# Patient Record
Sex: FEMALE | Race: WHITE | NOT HISPANIC OR LATINO | Employment: FULL TIME | URBAN - NONMETROPOLITAN AREA
[De-identification: names, ages, dates, MRNs, and addresses within clinical notes are randomized per-mention and may not be internally consistent; named-entity substitution may affect disease eponyms.]

---

## 2020-07-06 ENCOUNTER — TELEPHONE (OUTPATIENT)
Dept: OBSTETRICS AND GYNECOLOGY | Facility: CLINIC | Age: 25
End: 2020-07-06

## 2020-07-07 ENCOUNTER — OFFICE VISIT (OUTPATIENT)
Dept: OBSTETRICS AND GYNECOLOGY | Facility: CLINIC | Age: 25
End: 2020-07-07

## 2020-07-07 VITALS
WEIGHT: 265 LBS | BODY MASS INDEX: 40.16 KG/M2 | HEIGHT: 68 IN | DIASTOLIC BLOOD PRESSURE: 84 MMHG | SYSTOLIC BLOOD PRESSURE: 132 MMHG

## 2020-07-07 DIAGNOSIS — N91.2 AMENORRHEA: Primary | ICD-10-CM

## 2020-07-07 DIAGNOSIS — Z32.01 POSITIVE PREGNANCY TEST: ICD-10-CM

## 2020-07-07 PROCEDURE — 99202 OFFICE O/P NEW SF 15 MIN: CPT | Performed by: OBSTETRICS & GYNECOLOGY

## 2020-07-07 RX ORDER — FAMOTIDINE 20 MG/1
20 TABLET, FILM COATED ORAL 2 TIMES DAILY
COMMUNITY
End: 2020-08-25

## 2020-07-07 RX ORDER — CETIRIZINE HYDROCHLORIDE 10 MG/1
10 TABLET ORAL DAILY
COMMUNITY

## 2020-07-07 RX ORDER — PRENATAL VIT/IRON FUM/FOLIC AC 27MG-0.8MG
TABLET ORAL DAILY
COMMUNITY

## 2020-07-07 NOTE — PROGRESS NOTES
"Alyssia Riggs is a 25 y.o. female here today for evaluation of amenorrhea.  She reports that she has regular cycles and is trying to get pregnant.  Her last menstrual period was April 29, and she had a positive home urine pregnancy test on July 1.  She reports home urine pregnancy tests were negative the week before.  She is taking a prenatal vitamin.  She has some breast tenderness and mild left-sided crampy pain, but denies nausea or vaginal bleeding.    Social History     Tobacco Use   • Smoking status: Never Smoker   • Smokeless tobacco: Never Used   Substance Use Topics   • Alcohol use: Never     Frequency: Never   • Drug use: Never      Visit Vitals  /84 (BP Location: Left arm, Patient Position: Sitting)   Ht 172.7 cm (68\")   Wt 120 kg (265 lb)   LMP 04/29/2020   BMI 40.29 kg/m²     Pleasant female no acute distress  Mood and affect normal  Breathing unlabored  Abdomen nontender    Assessment: Secondary amenorrhea, positive pregnancy test    We discussed pain and bleeding precautions, as well as first-line therapy for pregnancy related nausea.  She will return to the office in about 3 weeks for a new OB visit, and call in the meantime if she has questions or concerns.      "

## 2020-07-30 ENCOUNTER — INITIAL PRENATAL (OUTPATIENT)
Dept: OBSTETRICS AND GYNECOLOGY | Facility: CLINIC | Age: 25
End: 2020-07-30

## 2020-07-30 VITALS — BODY MASS INDEX: 40.29 KG/M2 | SYSTOLIC BLOOD PRESSURE: 124 MMHG | WEIGHT: 265 LBS | DIASTOLIC BLOOD PRESSURE: 84 MMHG

## 2020-07-30 DIAGNOSIS — Z12.4 SCREENING FOR CERVICAL CANCER: ICD-10-CM

## 2020-07-30 DIAGNOSIS — Z34.01 ENCOUNTER FOR SUPERVISION OF NORMAL FIRST PREGNANCY IN FIRST TRIMESTER: Primary | ICD-10-CM

## 2020-07-30 PROCEDURE — 0501F PRENATAL FLOW SHEET: CPT | Performed by: OBSTETRICS & GYNECOLOGY

## 2020-07-30 PROCEDURE — G0123 SCREEN CERV/VAG THIN LAYER: HCPCS | Performed by: OBSTETRICS & GYNECOLOGY

## 2020-07-30 NOTE — PROGRESS NOTES
New OB, US today shows 8 weeks gestation, EDC 3/8/21  Having nausea, fatigue, but no headaches  New OB labs today  Discussed OTC Unisom and B6  Discussed normal prenatal routines  Questions answered

## 2020-08-04 LAB
ABO GROUP BLD: NORMAL
BACTERIA UR CULT: NO GROWTH
BACTERIA UR CULT: NORMAL
BASOPHILS # BLD AUTO: 0.07 10*3/MM3 (ref 0–0.2)
BASOPHILS NFR BLD AUTO: 0.6 % (ref 0–1.5)
BLD GP AB SCN SERPL QL: NEGATIVE
C TRACH RRNA SPEC QL NAA+PROBE: NEGATIVE
DRUGS UR: NORMAL
EOSINOPHIL # BLD AUTO: 0.08 10*3/MM3 (ref 0–0.4)
EOSINOPHIL NFR BLD AUTO: 0.7 % (ref 0.3–6.2)
ERYTHROCYTE [DISTWIDTH] IN BLOOD BY AUTOMATED COUNT: 13.4 % (ref 12.3–15.4)
HCT VFR BLD AUTO: 37.2 % (ref 34–46.6)
HGB BLD-MCNC: 12.3 G/DL (ref 12–15.9)
HIV 1+2 AB+HIV1 P24 AG SERPL QL IA: NON REACTIVE
IMM GRANULOCYTES # BLD AUTO: 0.04 10*3/MM3 (ref 0–0.05)
IMM GRANULOCYTES NFR BLD AUTO: 0.3 % (ref 0–0.5)
LYMPHOCYTES # BLD AUTO: 1.83 10*3/MM3 (ref 0.7–3.1)
LYMPHOCYTES NFR BLD AUTO: 15.4 % (ref 19.6–45.3)
MCH RBC QN AUTO: 27.4 PG (ref 26.6–33)
MCHC RBC AUTO-ENTMCNC: 33.1 G/DL (ref 31.5–35.7)
MCV RBC AUTO: 82.9 FL (ref 79–97)
MONOCYTES # BLD AUTO: 0.78 10*3/MM3 (ref 0.1–0.9)
MONOCYTES NFR BLD AUTO: 6.6 % (ref 5–12)
N GONORRHOEA RRNA SPEC QL NAA+PROBE: NEGATIVE
NEUTROPHILS # BLD AUTO: 9.06 10*3/MM3 (ref 1.7–7)
NEUTROPHILS NFR BLD AUTO: 76.4 % (ref 42.7–76)
NRBC BLD AUTO-RTO: 0 /100 WBC (ref 0–0.2)
PLATELET # BLD AUTO: 347 10*3/MM3 (ref 140–450)
RBC # BLD AUTO: 4.49 10*6/MM3 (ref 3.77–5.28)
RH BLD: POSITIVE
RPR SER QL: NON REACTIVE
RUBV IGG SERPL IA-ACNC: 1.37 INDEX
WBC # BLD AUTO: 11.86 10*3/MM3 (ref 3.4–10.8)

## 2020-08-05 LAB
GEN CATEG CVX/VAG CYTO-IMP: NORMAL
LAB AP CASE REPORT: NORMAL
LAB AP GYN ADDITIONAL INFORMATION: NORMAL
PATH INTERP SPEC-IMP: NORMAL
STAT OF ADQ CVX/VAG CYTO-IMP: NORMAL

## 2020-08-25 ENCOUNTER — ROUTINE PRENATAL (OUTPATIENT)
Dept: OBSTETRICS AND GYNECOLOGY | Facility: CLINIC | Age: 25
End: 2020-08-25

## 2020-08-25 VITALS — WEIGHT: 260 LBS | BODY MASS INDEX: 39.53 KG/M2 | SYSTOLIC BLOOD PRESSURE: 130 MMHG | DIASTOLIC BLOOD PRESSURE: 86 MMHG

## 2020-08-25 DIAGNOSIS — Z34.02 ENCOUNTER FOR SUPERVISION OF NORMAL FIRST PREGNANCY IN SECOND TRIMESTER: Primary | ICD-10-CM

## 2020-08-25 LAB
GLUCOSE UR STRIP-MCNC: NEGATIVE MG/DL
PROT UR STRIP-MCNC: NEGATIVE MG/DL

## 2020-08-25 PROCEDURE — 0502F SUBSEQUENT PRENATAL CARE: CPT | Performed by: OBSTETRICS & GYNECOLOGY

## 2020-08-25 RX ORDER — FAMOTIDINE 10 MG
10 TABLET ORAL 2 TIMES DAILY
COMMUNITY

## 2020-08-25 NOTE — PROGRESS NOTES
Feeling well, no nausea now  Reviewed normal prenatal labs  Discussed ffDNA and maternal carrier screening, declines

## 2020-09-22 ENCOUNTER — ROUTINE PRENATAL (OUTPATIENT)
Dept: OBSTETRICS AND GYNECOLOGY | Facility: CLINIC | Age: 25
End: 2020-09-22

## 2020-09-22 VITALS — SYSTOLIC BLOOD PRESSURE: 124 MMHG | DIASTOLIC BLOOD PRESSURE: 78 MMHG | WEIGHT: 265 LBS | BODY MASS INDEX: 40.29 KG/M2

## 2020-09-22 DIAGNOSIS — Z34.02 ENCOUNTER FOR SUPERVISION OF NORMAL FIRST PREGNANCY IN SECOND TRIMESTER: Primary | ICD-10-CM

## 2020-09-22 LAB
GLUCOSE UR STRIP-MCNC: NEGATIVE MG/DL
PROT UR STRIP-MCNC: NEGATIVE MG/DL

## 2020-09-22 PROCEDURE — 0502F SUBSEQUENT PRENATAL CARE: CPT | Performed by: OBSTETRICS & GYNECOLOGY

## 2020-09-22 RX ORDER — POLYETHYLENE GLYCOL 3350 17 G/17G
17 POWDER, FOR SOLUTION ORAL DAILY
COMMUNITY
End: 2021-04-22

## 2020-09-22 RX ORDER — AMOXICILLIN 250 MG
1 CAPSULE ORAL DAILY
COMMUNITY
End: 2021-04-22

## 2020-09-22 NOTE — PROGRESS NOTES
Not yet feeling fetal movement  Feeling well  Gender peek today, GIRL!  Desires ffDNA and maternal carrier screening  Schedule anatomy US

## 2020-10-23 ENCOUNTER — ROUTINE PRENATAL (OUTPATIENT)
Dept: OBSTETRICS AND GYNECOLOGY | Facility: CLINIC | Age: 25
End: 2020-10-23

## 2020-10-23 VITALS — DIASTOLIC BLOOD PRESSURE: 86 MMHG | WEIGHT: 269 LBS | BODY MASS INDEX: 40.9 KG/M2 | SYSTOLIC BLOOD PRESSURE: 136 MMHG

## 2020-10-23 DIAGNOSIS — Z34.02 ENCOUNTER FOR SUPERVISION OF NORMAL FIRST PREGNANCY IN SECOND TRIMESTER: Primary | ICD-10-CM

## 2020-10-23 LAB
GLUCOSE UR STRIP-MCNC: NEGATIVE MG/DL
PROT UR STRIP-MCNC: NEGATIVE MG/DL

## 2020-10-23 PROCEDURE — 0502F SUBSEQUENT PRENATAL CARE: CPT | Performed by: OBSTETRICS & GYNECOLOGY

## 2020-10-23 NOTE — PROGRESS NOTES
Starting to feel fetal movement  Feeling well  Has noticed worsening eczema, improved with oatmeal bath  Has anatomy US on Monday  Plans flu vaccine at work  Will call if needs topical steroids

## 2020-11-17 ENCOUNTER — ROUTINE PRENATAL (OUTPATIENT)
Dept: OBSTETRICS AND GYNECOLOGY | Facility: CLINIC | Age: 25
End: 2020-11-17

## 2020-11-17 VITALS — BODY MASS INDEX: 41.51 KG/M2 | WEIGHT: 273 LBS | DIASTOLIC BLOOD PRESSURE: 82 MMHG | SYSTOLIC BLOOD PRESSURE: 108 MMHG

## 2020-11-17 DIAGNOSIS — Z34.02 ENCOUNTER FOR SUPERVISION OF NORMAL FIRST PREGNANCY IN SECOND TRIMESTER: Primary | ICD-10-CM

## 2020-11-17 PROBLEM — Z34.01 ENCOUNTER FOR SUPERVISION OF NORMAL FIRST PREGNANCY IN FIRST TRIMESTER: Status: RESOLVED | Noted: 2020-07-30 | Resolved: 2020-11-17

## 2020-11-17 LAB
GLUCOSE UR STRIP-MCNC: NEGATIVE MG/DL
PROT UR STRIP-MCNC: NEGATIVE MG/DL

## 2020-11-17 PROCEDURE — 0502F SUBSEQUENT PRENATAL CARE: CPT | Performed by: OBSTETRICS & GYNECOLOGY

## 2020-11-17 NOTE — PROGRESS NOTES
Good fetal movement  Reviewed normal anatomy US  Glucola and Hgb next visit  Had flu vaccine at work (11/11)  Discussed possible dizzy spells and ensuring adequate hydration

## 2020-12-15 ENCOUNTER — ROUTINE PRENATAL (OUTPATIENT)
Dept: OBSTETRICS AND GYNECOLOGY | Facility: CLINIC | Age: 25
End: 2020-12-15

## 2020-12-15 VITALS — BODY MASS INDEX: 42.12 KG/M2 | DIASTOLIC BLOOD PRESSURE: 84 MMHG | SYSTOLIC BLOOD PRESSURE: 130 MMHG | WEIGHT: 277 LBS

## 2020-12-15 DIAGNOSIS — Z34.03 ENCOUNTER FOR SUPERVISION OF NORMAL FIRST PREGNANCY IN THIRD TRIMESTER: Primary | ICD-10-CM

## 2020-12-15 LAB
GLUCOSE UR STRIP-MCNC: NEGATIVE MG/DL
PROT UR STRIP-MCNC: NEGATIVE MG/DL

## 2020-12-15 PROCEDURE — 0502F SUBSEQUENT PRENATAL CARE: CPT | Performed by: OBSTETRICS & GYNECOLOGY

## 2020-12-15 NOTE — PATIENT INSTRUCTIONS
[place] Contractions  Contractions of the uterus can occur throughout pregnancy, but they are not always a sign that you are in labor. You may have practice contractions called [place] contractions. These false labor contractions are sometimes confused with true labor.  What are [place] contractions?  [place] contractions are tightening movements that occur in the muscles of the uterus before labor. Unlike true labor contractions, these contractions do not result in opening (dilation) and thinning of the cervix. Toward the end of pregnancy (32-34 weeks), [place] contractions can happen more often and may become stronger. These contractions are sometimes difficult to tell apart from true labor because they can be very uncomfortable. You should not feel embarrassed if you go to the hospital with false labor.  Sometimes, the only way to tell if you are in true labor is for your health care provider to look for changes in the cervix. The health care provider will do a physical exam and may monitor your contractions. If you are not in true labor, the exam should show that your cervix is not dilating and your water has not broken.  If there are no other health problems associated with your pregnancy, it is completely safe for you to be sent home with false labor. You may continue to have [place] contractions until you go into true labor.  How to tell the difference between true labor and false labor  True labor  · Contractions last 30-70 seconds.  · Contractions become very regular.  · Discomfort is usually felt in the top of the uterus, and it spreads to the lower abdomen and low back.  · Contractions do not go away with walking.  · Contractions usually become more intense and increase in frequency.  · The cervix dilates and gets thinner.  False labor  · Contractions are usually shorter and not as strong as true labor contractions.  · Contractions are usually irregular.  · Contractions  are often felt in the front of the lower abdomen and in the groin.  · Contractions may go away when you walk around or change positions while lying down.  · Contractions get weaker and are shorter-lasting as time goes on.  · The cervix usually does not dilate or become thin.  Follow these instructions at home:    · Take over-the-counter and prescription medicines only as told by your health care provider.  · Keep up with your usual exercises and follow other instructions from your health care provider.  · Eat and drink lightly if you think you are going into labor.  · If [place] contractions are making you uncomfortable:  ? Change your position from lying down or resting to walking, or change from walking to resting.  ? Sit and rest in a tub of warm water.  ? Drink enough fluid to keep your urine pale yellow. Dehydration may cause these contractions.  ? Do slow and deep breathing several times an hour.  · Keep all follow-up prenatal visits as told by your health care provider. This is important.  Contact a health care provider if:  · You have a fever.  · You have continuous pain in your abdomen.  Get help right away if:  · Your contractions become stronger, more regular, and closer together.  · You have fluid leaking or gushing from your vagina.  · You pass blood-tinged mucus (bloody show).  · You have bleeding from your vagina.  · You have low back pain that you never had before.  · You feel your baby’s head pushing down and causing pelvic pressure.  · Your baby is not moving inside you as much as it used to.  Summary  · Contractions that occur before labor are called [place] contractions, false labor, or practice contractions.  · [place] contractions are usually shorter, weaker, farther apart, and less regular than true labor contractions. True labor contractions usually become progressively stronger and regular, and they become more frequent.  · Manage discomfort from [place] contractions  by changing position, resting in a warm bath, drinking plenty of water, or practicing deep breathing.  This information is not intended to replace advice given to you by your health care provider. Make sure you discuss any questions you have with your health care provider.  Document Revised: 11/30/2018 Document Reviewed: 05/03/2018  Elsevier Patient Education © 2020 [employer]

## 2020-12-16 LAB
GLUCOSE 1H P 50 G GLC PO SERPL-MCNC: 159 MG/DL (ref 65–139)
HGB BLD-MCNC: 11.6 G/DL (ref 12–15.9)

## 2020-12-23 ENCOUNTER — TELEPHONE (OUTPATIENT)
Dept: OBSTETRICS AND GYNECOLOGY | Facility: CLINIC | Age: 25
End: 2020-12-23

## 2020-12-23 DIAGNOSIS — O24.419 GESTATIONAL DIABETES MELLITUS (GDM), ANTEPARTUM, GESTATIONAL DIABETES METHOD OF CONTROL UNSPECIFIED: Primary | ICD-10-CM

## 2020-12-29 ENCOUNTER — ROUTINE PRENATAL (OUTPATIENT)
Dept: OBSTETRICS AND GYNECOLOGY | Facility: CLINIC | Age: 25
End: 2020-12-29

## 2020-12-29 VITALS — BODY MASS INDEX: 42.57 KG/M2 | WEIGHT: 280 LBS | DIASTOLIC BLOOD PRESSURE: 82 MMHG | SYSTOLIC BLOOD PRESSURE: 132 MMHG

## 2020-12-29 DIAGNOSIS — Z34.83 ENCOUNTER FOR SUPERVISION OF OTHER NORMAL PREGNANCY IN THIRD TRIMESTER: Primary | ICD-10-CM

## 2020-12-29 PROBLEM — Z34.02 ENCOUNTER FOR SUPERVISION OF NORMAL FIRST PREGNANCY IN SECOND TRIMESTER: Status: RESOLVED | Noted: 2020-11-17 | Resolved: 2020-12-29

## 2020-12-29 PROBLEM — Z34.90 SUPERVISION OF NORMAL PREGNANCY: Status: ACTIVE | Noted: 2020-12-29

## 2020-12-29 LAB
GLUCOSE UR STRIP-MCNC: NEGATIVE MG/DL
PROT UR STRIP-MCNC: NEGATIVE MG/DL

## 2020-12-29 PROCEDURE — 0502F SUBSEQUENT PRENATAL CARE: CPT | Performed by: OBSTETRICS & GYNECOLOGY

## 2020-12-29 NOTE — PROGRESS NOTES
Good fetal movement  No contractions, no reflux  Normal 3 hour Glucola and Hgb  Discuss Tdap next visit  Growth US next visit for size > dates   labor precautions

## 2021-01-15 ENCOUNTER — ROUTINE PRENATAL (OUTPATIENT)
Dept: OBSTETRICS AND GYNECOLOGY | Facility: CLINIC | Age: 26
End: 2021-01-15

## 2021-01-15 VITALS — DIASTOLIC BLOOD PRESSURE: 76 MMHG | SYSTOLIC BLOOD PRESSURE: 134 MMHG | WEIGHT: 279 LBS | BODY MASS INDEX: 42.42 KG/M2

## 2021-01-15 DIAGNOSIS — Z34.83 ENCOUNTER FOR SUPERVISION OF OTHER NORMAL PREGNANCY IN THIRD TRIMESTER: Primary | ICD-10-CM

## 2021-01-15 LAB
GLUCOSE UR STRIP-MCNC: NEGATIVE MG/DL
PROT UR STRIP-MCNC: NEGATIVE MG/DL

## 2021-01-15 PROCEDURE — 90471 IMMUNIZATION ADMIN: CPT | Performed by: OBSTETRICS & GYNECOLOGY

## 2021-01-15 PROCEDURE — 0502F SUBSEQUENT PRENATAL CARE: CPT | Performed by: OBSTETRICS & GYNECOLOGY

## 2021-01-15 PROCEDURE — 90715 TDAP VACCINE 7 YRS/> IM: CPT | Performed by: OBSTETRICS & GYNECOLOGY

## 2021-01-15 NOTE — PROGRESS NOTES
Good fetal movement  No contractions  US today 18% growth, ANGLE 15.8cm  Tdap in office today   labor precautions

## 2021-01-26 ENCOUNTER — ROUTINE PRENATAL (OUTPATIENT)
Dept: OBSTETRICS AND GYNECOLOGY | Facility: CLINIC | Age: 26
End: 2021-01-26

## 2021-01-26 VITALS — WEIGHT: 285 LBS | DIASTOLIC BLOOD PRESSURE: 86 MMHG | BODY MASS INDEX: 43.33 KG/M2 | SYSTOLIC BLOOD PRESSURE: 134 MMHG

## 2021-01-26 DIAGNOSIS — Z34.03 ENCOUNTER FOR SUPERVISION OF NORMAL FIRST PREGNANCY IN THIRD TRIMESTER: Primary | ICD-10-CM

## 2021-01-26 LAB
GLUCOSE UR STRIP-MCNC: NEGATIVE MG/DL
PROT UR STRIP-MCNC: NEGATIVE MG/DL

## 2021-01-26 PROCEDURE — 0502F SUBSEQUENT PRENATAL CARE: CPT | Performed by: OBSTETRICS & GYNECOLOGY

## 2021-01-26 NOTE — PROGRESS NOTES
Good fetal movement  Labor precautions  GBS and cx's next visit    Diagnoses and all orders for this visit:    1. Encounter for supervision of normal first pregnancy in third trimester (Primary)

## 2021-02-18 ENCOUNTER — ROUTINE PRENATAL (OUTPATIENT)
Dept: OBSTETRICS AND GYNECOLOGY | Facility: CLINIC | Age: 26
End: 2021-02-18

## 2021-02-18 VITALS — SYSTOLIC BLOOD PRESSURE: 118 MMHG | DIASTOLIC BLOOD PRESSURE: 78 MMHG | BODY MASS INDEX: 44.09 KG/M2 | WEIGHT: 290 LBS

## 2021-02-18 DIAGNOSIS — Z34.03 ENCOUNTER FOR SUPERVISION OF NORMAL FIRST PREGNANCY IN THIRD TRIMESTER: Primary | ICD-10-CM

## 2021-02-18 LAB
GLUCOSE UR STRIP-MCNC: NEGATIVE MG/DL
PROT UR STRIP-MCNC: ABNORMAL MG/DL

## 2021-02-18 PROCEDURE — 0502F SUBSEQUENT PRENATAL CARE: CPT | Performed by: OBSTETRICS & GYNECOLOGY

## 2021-02-18 NOTE — PROGRESS NOTES
25 year old female  at 37.3 IUP presents for follow up prenatal care visit. No obstetrical complaints.   PE   SVE: 1 cm   A/P  at 37.3 IUP   RTC in 1 week   Labor precautions

## 2021-02-23 LAB
C TRACH RRNA SPEC QL NAA+PROBE: NEGATIVE
GP B STREP DNA SPEC QL NAA+PROBE: NEGATIVE
N GONORRHOEA RRNA SPEC QL NAA+PROBE: NEGATIVE

## 2021-02-25 ENCOUNTER — ROUTINE PRENATAL (OUTPATIENT)
Dept: OBSTETRICS AND GYNECOLOGY | Facility: CLINIC | Age: 26
End: 2021-02-25

## 2021-02-25 VITALS — WEIGHT: 289 LBS | DIASTOLIC BLOOD PRESSURE: 88 MMHG | SYSTOLIC BLOOD PRESSURE: 132 MMHG | BODY MASS INDEX: 43.94 KG/M2

## 2021-02-25 DIAGNOSIS — Z34.03 ENCOUNTER FOR SUPERVISION OF NORMAL FIRST PREGNANCY IN THIRD TRIMESTER: Primary | ICD-10-CM

## 2021-02-25 LAB
GLUCOSE UR STRIP-MCNC: NEGATIVE MG/DL
PROT UR STRIP-MCNC: NEGATIVE MG/DL

## 2021-02-25 PROCEDURE — 0502F SUBSEQUENT PRENATAL CARE: CPT | Performed by: OBSTETRICS & GYNECOLOGY

## 2021-02-25 NOTE — PROGRESS NOTES
Good fetal movement  No contractions  Reviewed GBS negative  Cervix mid position, moderate  Labor instructions    Diagnoses and all orders for this visit:    1. Encounter for supervision of normal first pregnancy in third trimester (Primary)

## 2021-03-04 ENCOUNTER — ROUTINE PRENATAL (OUTPATIENT)
Dept: OBSTETRICS AND GYNECOLOGY | Facility: CLINIC | Age: 26
End: 2021-03-04

## 2021-03-04 VITALS — BODY MASS INDEX: 43.94 KG/M2 | SYSTOLIC BLOOD PRESSURE: 112 MMHG | DIASTOLIC BLOOD PRESSURE: 86 MMHG | WEIGHT: 289 LBS

## 2021-03-04 DIAGNOSIS — Z34.03 ENCOUNTER FOR SUPERVISION OF NORMAL FIRST PREGNANCY IN THIRD TRIMESTER: Primary | ICD-10-CM

## 2021-03-04 LAB
GLUCOSE UR STRIP-MCNC: NEGATIVE MG/DL
PROT UR STRIP-MCNC: NEGATIVE MG/DL

## 2021-03-04 PROCEDURE — 0502F SUBSEQUENT PRENATAL CARE: CPT | Performed by: OBSTETRICS & GYNECOLOGY

## 2021-03-04 NOTE — PROGRESS NOTES
Good fetal movement  No contractions  Cervix soft, mid position  Labor instructions    Diagnoses and all orders for this visit:    1. Encounter for supervision of normal first pregnancy in third trimester (Primary)

## 2021-03-08 ENCOUNTER — ANESTHESIA (OUTPATIENT)
Dept: LABOR AND DELIVERY | Facility: HOSPITAL | Age: 26
End: 2021-03-08

## 2021-03-08 ENCOUNTER — ANESTHESIA EVENT (OUTPATIENT)
Dept: LABOR AND DELIVERY | Facility: HOSPITAL | Age: 26
End: 2021-03-08

## 2021-03-08 ENCOUNTER — HOSPITAL ENCOUNTER (INPATIENT)
Facility: HOSPITAL | Age: 26
LOS: 2 days | Discharge: HOME | End: 2021-03-10
Attending: OBSTETRICS & GYNECOLOGY | Admitting: OBSTETRICS & GYNECOLOGY

## 2021-03-08 DIAGNOSIS — Z37.9 NORMAL LABOR: Primary | ICD-10-CM

## 2021-03-08 LAB
ABO GROUP BLD: NORMAL
ALBUMIN SERPL-MCNC: 3.3 G/DL (ref 3.5–5.2)
ALBUMIN/GLOB SERPL: 0.8 G/DL
ALP SERPL-CCNC: 190 U/L (ref 39–117)
ALT SERPL W P-5'-P-CCNC: 18 U/L (ref 1–33)
ANION GAP SERPL CALCULATED.3IONS-SCNC: 11 MMOL/L (ref 5–15)
AST SERPL-CCNC: 17 U/L (ref 1–32)
BILIRUB SERPL-MCNC: 0.5 MG/DL (ref 0–1.2)
BLD GP AB SCN SERPL QL: NEGATIVE
BUN SERPL-MCNC: 9 MG/DL (ref 6–20)
BUN/CREAT SERPL: 25 (ref 7–25)
CALCIUM SPEC-SCNC: 8.8 MG/DL (ref 8.6–10.5)
CHLORIDE SERPL-SCNC: 105 MMOL/L (ref 98–107)
CO2 SERPL-SCNC: 22 MMOL/L (ref 22–29)
CREAT SERPL-MCNC: 0.36 MG/DL (ref 0.57–1)
DEPRECATED RDW RBC AUTO: 39.1 FL (ref 37–54)
ERYTHROCYTE [DISTWIDTH] IN BLOOD BY AUTOMATED COUNT: 13.3 % (ref 12.3–15.4)
EXPIRATION DATE: NORMAL
GFR SERPL CREATININE-BSD FRML MDRD: >150 ML/MIN/1.73
GLOBULIN UR ELPH-MCNC: 4.1 GM/DL
GLUCOSE SERPL-MCNC: 91 MG/DL (ref 65–99)
HBV SURFACE AG SERPL QL IA: NORMAL
HCT VFR BLD AUTO: 34.4 % (ref 34–46.6)
HGB BLD-MCNC: 11.5 G/DL (ref 12–15.9)
LYMPHOCYTES # BLD MANUAL: 2.39 10*3/MM3 (ref 0.7–3.1)
LYMPHOCYTES NFR BLD MANUAL: 18 % (ref 19.6–45.3)
LYMPHOCYTES NFR BLD MANUAL: 3 % (ref 5–12)
Lab: 1081
MCH RBC QN AUTO: 27.4 PG (ref 26.6–33)
MCHC RBC AUTO-ENTMCNC: 33.4 G/DL (ref 31.5–35.7)
MCV RBC AUTO: 81.9 FL (ref 79–97)
MONOCYTES # BLD AUTO: 0.4 10*3/MM3 (ref 0.1–0.9)
NEUTROPHILS # BLD AUTO: 10.48 10*3/MM3 (ref 1.7–7)
NEUTROPHILS NFR BLD MANUAL: 79 % (ref 42.7–76)
NRBC SPEC MANUAL: 1 /100 WBC (ref 0–0.2)
PLAT MORPH BLD: NORMAL
PLATELET # BLD AUTO: 334 10*3/MM3 (ref 140–450)
PMV BLD AUTO: 9.6 FL (ref 6–12)
POTASSIUM SERPL-SCNC: 4.2 MMOL/L (ref 3.5–5.2)
PROT SERPL-MCNC: 7.4 G/DL (ref 6–8.5)
PROT UR STRIP-MCNC: NEGATIVE MG/DL
RBC # BLD AUTO: 4.2 10*6/MM3 (ref 3.77–5.28)
RBC MORPH BLD: NORMAL
RH BLD: POSITIVE
SODIUM SERPL-SCNC: 138 MMOL/L (ref 136–145)
T&S EXPIRATION DATE: NORMAL
URATE SERPL-MCNC: 4.6 MG/DL (ref 2.4–5.7)
WBC # BLD AUTO: 13.26 10*3/MM3 (ref 3.4–10.8)
WBC MORPH BLD: NORMAL

## 2021-03-08 PROCEDURE — 51702 INSERT TEMP BLADDER CATH: CPT

## 2021-03-08 PROCEDURE — 86901 BLOOD TYPING SEROLOGIC RH(D): CPT | Performed by: OBSTETRICS & GYNECOLOGY

## 2021-03-08 PROCEDURE — 25010000002 ONDANSETRON PER 1 MG: Performed by: OBSTETRICS & GYNECOLOGY

## 2021-03-08 PROCEDURE — C1755 CATHETER, INTRASPINAL: HCPCS | Performed by: NURSE ANESTHETIST, CERTIFIED REGISTERED

## 2021-03-08 PROCEDURE — 85007 BL SMEAR W/DIFF WBC COUNT: CPT | Performed by: OBSTETRICS & GYNECOLOGY

## 2021-03-08 PROCEDURE — 87340 HEPATITIS B SURFACE AG IA: CPT | Performed by: OBSTETRICS & GYNECOLOGY

## 2021-03-08 PROCEDURE — 84550 ASSAY OF BLOOD/URIC ACID: CPT | Performed by: OBSTETRICS & GYNECOLOGY

## 2021-03-08 PROCEDURE — 86850 RBC ANTIBODY SCREEN: CPT | Performed by: OBSTETRICS & GYNECOLOGY

## 2021-03-08 PROCEDURE — 86900 BLOOD TYPING SEROLOGIC ABO: CPT | Performed by: OBSTETRICS & GYNECOLOGY

## 2021-03-08 PROCEDURE — 80053 COMPREHEN METABOLIC PANEL: CPT | Performed by: OBSTETRICS & GYNECOLOGY

## 2021-03-08 PROCEDURE — 25010000002 ROPIVACAINE PER 1 MG: Performed by: NURSE ANESTHETIST, CERTIFIED REGISTERED

## 2021-03-08 PROCEDURE — 85027 COMPLETE CBC AUTOMATED: CPT | Performed by: OBSTETRICS & GYNECOLOGY

## 2021-03-08 PROCEDURE — 0KQM0ZZ REPAIR PERINEUM MUSCLE, OPEN APPROACH: ICD-10-PCS | Performed by: OBSTETRICS & GYNECOLOGY

## 2021-03-08 PROCEDURE — 81002 URINALYSIS NONAUTO W/O SCOPE: CPT | Performed by: OBSTETRICS & GYNECOLOGY

## 2021-03-08 PROCEDURE — 59400 OBSTETRICAL CARE: CPT | Performed by: OBSTETRICS & GYNECOLOGY

## 2021-03-08 RX ORDER — OXYTOCIN/0.9 % SODIUM CHLORIDE 30/500 ML
125 PLASTIC BAG, INJECTION (ML) INTRAVENOUS CONTINUOUS PRN
Status: DISCONTINUED | OUTPATIENT
Start: 2021-03-08 | End: 2021-03-08 | Stop reason: HOSPADM

## 2021-03-08 RX ORDER — MISOPROSTOL 200 UG/1
800 TABLET ORAL AS NEEDED
Status: DISCONTINUED | OUTPATIENT
Start: 2021-03-08 | End: 2021-03-08

## 2021-03-08 RX ORDER — EPHEDRINE SULFATE 50 MG/ML
10 INJECTION, SOLUTION INTRAVENOUS
Status: DISCONTINUED | OUTPATIENT
Start: 2021-03-08 | End: 2021-03-08 | Stop reason: HOSPADM

## 2021-03-08 RX ORDER — MORPHINE SULFATE 2 MG/ML
1 INJECTION, SOLUTION INTRAMUSCULAR; INTRAVENOUS EVERY 4 HOURS PRN
Status: DISCONTINUED | OUTPATIENT
Start: 2021-03-08 | End: 2021-03-10 | Stop reason: HOSPADM

## 2021-03-08 RX ORDER — ONDANSETRON 2 MG/ML
4 INJECTION INTRAMUSCULAR; INTRAVENOUS EVERY 6 HOURS PRN
Status: DISCONTINUED | OUTPATIENT
Start: 2021-03-08 | End: 2021-03-08

## 2021-03-08 RX ORDER — PROMETHAZINE HYDROCHLORIDE 12.5 MG/1
12.5 SUPPOSITORY RECTAL EVERY 6 HOURS PRN
Status: DISCONTINUED | OUTPATIENT
Start: 2021-03-08 | End: 2021-03-08

## 2021-03-08 RX ORDER — HYDROCORTISONE 25 MG/G
1 CREAM TOPICAL AS NEEDED
Status: DISCONTINUED | OUTPATIENT
Start: 2021-03-08 | End: 2021-03-10 | Stop reason: HOSPADM

## 2021-03-08 RX ORDER — OXYTOCIN/0.9 % SODIUM CHLORIDE 30/500 ML
250 PLASTIC BAG, INJECTION (ML) INTRAVENOUS CONTINUOUS
Status: ACTIVE | OUTPATIENT
Start: 2021-03-08 | End: 2021-03-08

## 2021-03-08 RX ORDER — PRENATAL VIT/IRON FUM/FOLIC AC 27MG-0.8MG
1 TABLET ORAL DAILY
Status: DISCONTINUED | OUTPATIENT
Start: 2021-03-08 | End: 2021-03-10 | Stop reason: HOSPADM

## 2021-03-08 RX ORDER — METHYLERGONOVINE MALEATE 0.2 MG/ML
200 INJECTION INTRAVENOUS ONCE AS NEEDED
Status: DISCONTINUED | OUTPATIENT
Start: 2021-03-08 | End: 2021-03-10 | Stop reason: HOSPADM

## 2021-03-08 RX ORDER — ONDANSETRON 4 MG/1
4 TABLET, FILM COATED ORAL EVERY 6 HOURS PRN
Status: DISCONTINUED | OUTPATIENT
Start: 2021-03-08 | End: 2021-03-08

## 2021-03-08 RX ORDER — LIDOCAINE HYDROCHLORIDE AND EPINEPHRINE 15; 5 MG/ML; UG/ML
INJECTION, SOLUTION EPIDURAL AS NEEDED
Status: DISCONTINUED | OUTPATIENT
Start: 2021-03-08 | End: 2021-03-09 | Stop reason: SURG

## 2021-03-08 RX ORDER — SODIUM CHLORIDE 0.9 % (FLUSH) 0.9 %
1-10 SYRINGE (ML) INJECTION AS NEEDED
Status: DISCONTINUED | OUTPATIENT
Start: 2021-03-08 | End: 2021-03-10 | Stop reason: HOSPADM

## 2021-03-08 RX ORDER — ONDANSETRON 2 MG/ML
4 INJECTION INTRAMUSCULAR; INTRAVENOUS EVERY 6 HOURS PRN
Status: DISCONTINUED | OUTPATIENT
Start: 2021-03-08 | End: 2021-03-10 | Stop reason: HOSPADM

## 2021-03-08 RX ORDER — BUTORPHANOL TARTRATE 1 MG/ML
1 INJECTION, SOLUTION INTRAMUSCULAR; INTRAVENOUS
Status: DISCONTINUED | OUTPATIENT
Start: 2021-03-08 | End: 2021-03-08 | Stop reason: HOSPADM

## 2021-03-08 RX ORDER — OXYTOCIN/0.9 % SODIUM CHLORIDE 30/500 ML
2-30 PLASTIC BAG, INJECTION (ML) INTRAVENOUS
Status: DISCONTINUED | OUTPATIENT
Start: 2021-03-08 | End: 2021-03-08 | Stop reason: HOSPADM

## 2021-03-08 RX ORDER — LIDOCAINE HYDROCHLORIDE 10 MG/ML
5 INJECTION, SOLUTION EPIDURAL; INFILTRATION; INTRACAUDAL; PERINEURAL AS NEEDED
Status: DISCONTINUED | OUTPATIENT
Start: 2021-03-08 | End: 2021-03-08 | Stop reason: HOSPADM

## 2021-03-08 RX ORDER — HYDROCODONE BITARTRATE AND ACETAMINOPHEN 5; 325 MG/1; MG/1
1 TABLET ORAL EVERY 4 HOURS PRN
Status: DISCONTINUED | OUTPATIENT
Start: 2021-03-08 | End: 2021-03-10 | Stop reason: HOSPADM

## 2021-03-08 RX ORDER — CETIRIZINE HYDROCHLORIDE 10 MG/1
10 TABLET ORAL DAILY
Status: DISCONTINUED | OUTPATIENT
Start: 2021-03-09 | End: 2021-03-10 | Stop reason: HOSPADM

## 2021-03-08 RX ORDER — SODIUM CHLORIDE, SODIUM LACTATE, POTASSIUM CHLORIDE, CALCIUM CHLORIDE 600; 310; 30; 20 MG/100ML; MG/100ML; MG/100ML; MG/100ML
125 INJECTION, SOLUTION INTRAVENOUS CONTINUOUS
Status: DISCONTINUED | OUTPATIENT
Start: 2021-03-08 | End: 2021-03-09

## 2021-03-08 RX ORDER — METHYLERGONOVINE MALEATE 0.2 MG/ML
200 INJECTION INTRAVENOUS ONCE AS NEEDED
Status: DISCONTINUED | OUTPATIENT
Start: 2021-03-08 | End: 2021-03-08

## 2021-03-08 RX ORDER — SODIUM CHLORIDE 0.9 % (FLUSH) 0.9 %
3 SYRINGE (ML) INJECTION EVERY 12 HOURS SCHEDULED
Status: DISCONTINUED | OUTPATIENT
Start: 2021-03-08 | End: 2021-03-08 | Stop reason: HOSPADM

## 2021-03-08 RX ORDER — OXYTOCIN/0.9 % SODIUM CHLORIDE 30/500 ML
999 PLASTIC BAG, INJECTION (ML) INTRAVENOUS ONCE
Status: DISCONTINUED | OUTPATIENT
Start: 2021-03-08 | End: 2021-03-08 | Stop reason: HOSPADM

## 2021-03-08 RX ORDER — HYDROXYZINE HYDROCHLORIDE 25 MG/1
50 TABLET, FILM COATED ORAL NIGHTLY PRN
Status: DISCONTINUED | OUTPATIENT
Start: 2021-03-08 | End: 2021-03-10 | Stop reason: HOSPADM

## 2021-03-08 RX ORDER — TRISODIUM CITRATE DIHYDRATE AND CITRIC ACID MONOHYDRATE 500; 334 MG/5ML; MG/5ML
30 SOLUTION ORAL ONCE
Status: DISCONTINUED | OUTPATIENT
Start: 2021-03-08 | End: 2021-03-08 | Stop reason: HOSPADM

## 2021-03-08 RX ORDER — CARBOPROST TROMETHAMINE 250 UG/ML
250 INJECTION, SOLUTION INTRAMUSCULAR AS NEEDED
Status: DISCONTINUED | OUTPATIENT
Start: 2021-03-08 | End: 2021-03-08 | Stop reason: HOSPADM

## 2021-03-08 RX ORDER — MISOPROSTOL 200 UG/1
600 TABLET ORAL ONCE AS NEEDED
Status: DISCONTINUED | OUTPATIENT
Start: 2021-03-08 | End: 2021-03-10 | Stop reason: HOSPADM

## 2021-03-08 RX ORDER — PROMETHAZINE HYDROCHLORIDE 25 MG/1
12.5 TABLET ORAL EVERY 6 HOURS PRN
Status: DISCONTINUED | OUTPATIENT
Start: 2021-03-08 | End: 2021-03-08

## 2021-03-08 RX ORDER — PROMETHAZINE HYDROCHLORIDE 25 MG/1
25 TABLET ORAL EVERY 6 HOURS PRN
Status: DISCONTINUED | OUTPATIENT
Start: 2021-03-08 | End: 2021-03-10 | Stop reason: HOSPADM

## 2021-03-08 RX ORDER — SODIUM CHLORIDE 0.9 % (FLUSH) 0.9 %
10 SYRINGE (ML) INJECTION AS NEEDED
Status: DISCONTINUED | OUTPATIENT
Start: 2021-03-08 | End: 2021-03-08

## 2021-03-08 RX ORDER — IBUPROFEN 800 MG/1
800 TABLET ORAL EVERY 8 HOURS PRN
Status: DISCONTINUED | OUTPATIENT
Start: 2021-03-08 | End: 2021-03-08

## 2021-03-08 RX ORDER — PROMETHAZINE HYDROCHLORIDE 12.5 MG/1
12.5 SUPPOSITORY RECTAL EVERY 6 HOURS PRN
Status: DISCONTINUED | OUTPATIENT
Start: 2021-03-08 | End: 2021-03-10 | Stop reason: HOSPADM

## 2021-03-08 RX ORDER — DOCUSATE SODIUM 100 MG/1
100 CAPSULE, LIQUID FILLED ORAL 2 TIMES DAILY
Status: DISCONTINUED | OUTPATIENT
Start: 2021-03-08 | End: 2021-03-10 | Stop reason: HOSPADM

## 2021-03-08 RX ORDER — ROPIVACAINE HYDROCHLORIDE 5 MG/ML
INJECTION, SOLUTION EPIDURAL; INFILTRATION; PERINEURAL AS NEEDED
Status: DISCONTINUED | OUTPATIENT
Start: 2021-03-08 | End: 2021-03-09 | Stop reason: SURG

## 2021-03-08 RX ORDER — IBUPROFEN 600 MG/1
600 TABLET ORAL EVERY 8 HOURS PRN
Status: DISCONTINUED | OUTPATIENT
Start: 2021-03-08 | End: 2021-03-10 | Stop reason: HOSPADM

## 2021-03-08 RX ORDER — BISACODYL 10 MG
10 SUPPOSITORY, RECTAL RECTAL DAILY PRN
Status: DISCONTINUED | OUTPATIENT
Start: 2021-03-09 | End: 2021-03-10 | Stop reason: HOSPADM

## 2021-03-08 RX ORDER — FAMOTIDINE 10 MG/ML
20 INJECTION, SOLUTION INTRAVENOUS ONCE AS NEEDED
Status: CANCELLED | OUTPATIENT
Start: 2021-03-08

## 2021-03-08 RX ORDER — NALOXONE HCL 0.4 MG/ML
0.4 VIAL (ML) INJECTION
Status: DISCONTINUED | OUTPATIENT
Start: 2021-03-08 | End: 2021-03-10 | Stop reason: HOSPADM

## 2021-03-08 RX ORDER — CALCIUM CARBONATE 200(500)MG
2 TABLET,CHEWABLE ORAL 3 TIMES DAILY PRN
Status: DISCONTINUED | OUTPATIENT
Start: 2021-03-08 | End: 2021-03-10 | Stop reason: HOSPADM

## 2021-03-08 RX ORDER — ONDANSETRON 4 MG/1
4 TABLET, FILM COATED ORAL EVERY 8 HOURS PRN
Status: DISCONTINUED | OUTPATIENT
Start: 2021-03-08 | End: 2021-03-10 | Stop reason: HOSPADM

## 2021-03-08 RX ADMIN — OXYTOCIN-SODIUM CHLORIDE 0.9% IV SOLN 30 UNIT/500ML 250 ML/HR: 30-0.9/5 SOLUTION at 17:49

## 2021-03-08 RX ADMIN — LIDOCAINE HYDROCHLORIDE AND EPINEPHRINE 3 ML: 15; 5 INJECTION, SOLUTION EPIDURAL at 12:24

## 2021-03-08 RX ADMIN — BENZOCAINE AND LEVOMENTHOL 1 APPLICATION: 200; 5 SPRAY TOPICAL at 22:15

## 2021-03-08 RX ADMIN — ROPIVACAINE HYDROCHLORIDE 8 ML/HR: 2 INJECTION, SOLUTION EPIDURAL; INFILTRATION at 12:32

## 2021-03-08 RX ADMIN — OXYTOCIN-SODIUM CHLORIDE 0.9% IV SOLN 30 UNIT/500ML 2 MILLI-UNITS/MIN: 30-0.9/5 SOLUTION at 08:38

## 2021-03-08 RX ADMIN — IBUPROFEN 600 MG: 600 TABLET, FILM COATED ORAL at 22:15

## 2021-03-08 RX ADMIN — EPHEDRINE SULFATE 10 MG: 50 INJECTION INTRAVENOUS at 12:45

## 2021-03-08 RX ADMIN — ROPIVACAINE HYDROCHLORIDE 10 ML: 5 INJECTION, SOLUTION EPIDURAL; INFILTRATION; PERINEURAL at 12:26

## 2021-03-08 RX ADMIN — SODIUM CHLORIDE, POTASSIUM CHLORIDE, SODIUM LACTATE AND CALCIUM CHLORIDE 125 ML/HR: 600; 310; 30; 20 INJECTION, SOLUTION INTRAVENOUS at 08:14

## 2021-03-08 RX ADMIN — SODIUM CHLORIDE, POTASSIUM CHLORIDE, SODIUM LACTATE AND CALCIUM CHLORIDE 1000 ML: 600; 310; 30; 20 INJECTION, SOLUTION INTRAVENOUS at 12:02

## 2021-03-08 RX ADMIN — ONDANSETRON HYDROCHLORIDE 4 MG: 2 SOLUTION INTRAMUSCULAR; INTRAVENOUS at 13:09

## 2021-03-08 RX ADMIN — DOCUSATE SODIUM 100 MG: 100 CAPSULE ORAL at 22:08

## 2021-03-08 RX ADMIN — PRENATAL VIT W/ FE FUMARATE-FA TAB 27-0.8 MG 1 TABLET: 27-0.8 TAB at 22:15

## 2021-03-08 RX ADMIN — SODIUM CHLORIDE, POTASSIUM CHLORIDE, SODIUM LACTATE AND CALCIUM CHLORIDE 125 ML/HR: 600; 310; 30; 20 INJECTION, SOLUTION INTRAVENOUS at 13:00

## 2021-03-08 NOTE — H&P
[facility]  Alyssia Riggs  : 1995  MRN: [id number]  CSN: [id number]    History and Physical    Subjective   Alyssia Riggs is a 25 y.o. year old  with an Estimated Date of Delivery: 3/8/21 currently at 40w0d presenting with irregular contractions.    Prenatal care has been with Dr. Bairon Lazcano.  It has been benign.    OB History    Para Term  AB Living   1 0 0 0 0 0   SAB TAB Ectopic Molar Multiple Live Births   0 0 0 0 0 0      # Outcome Date GA Lbr Neil/2nd Weight Sex Delivery Anes PTL Lv   1 Current              History reviewed. No pertinent past medical history.  Past Surgical History:   Procedure Laterality Date   • CHOLECYSTECTOMY     • WISDOM TOOTH EXTRACTION         Current Facility-Administered Medications:   •  butorphanol (STADOL) injection 1 mg, 1 mg, Intravenous, Q3H PRN, Eliceo Lazcano MD  •  lactated ringers bolus 1,000 mL, 1,000 mL, Intravenous, Once, Eliceo Lazcano MD  •  lactated ringers infusion, 125 mL/hr, Intravenous, Continuous, Eliceo Lazcano MD  •  lidocaine PF 1% (XYLOCAINE) injection 5 mL, 5 mL, Intradermal, PRN, Eliceo Lazcano MD  •  ondansetron (ZOFRAN) tablet 4 mg, 4 mg, Oral, Q6H PRN **OR** ondansetron (ZOFRAN) injection 4 mg, 4 mg, Intravenous, Q6H PRN, Eliceo Lazcano MD  •  oxytocin (PITOCIN) 30 units in 0.9% sodium chloride 500 mL (premix), 2-30 aditya-units/min, Intravenous, Titrated, Eilceo Lazcano MD  •  sodium chloride 0.9 % flush 10 mL, 10 mL, Intravenous, PRN, Eliceo Lazcano MD  •  sodium chloride 0.9 % flush 3 mL, 3 mL, Intravenous, Q12H, Eliceo Lazcano MD    Allergies   Allergen Reactions   • Amoxicillin Anaphylaxis   • Diphenhydramine Hives   • Penicillins Anaphylaxis   • Other Nausea Only     Anesthesia makes patient nauseated     Social History    Tobacco Use      Smoking status: Never Smoker      Smokeless tobacco: Never Used    Review of Systems      Objective   /80 (BP Location:  "Right arm, Patient Position: Sitting)   Pulse 96   Resp 16   Ht 172.7 cm (68\")   Wt 134 kg (295 lb 8 oz)   LMP 04/29/2020   Breastfeeding Yes   BMI 44.93 kg/m²   General: well developed; well nourished  no acute distress   Heart: regular rate and rhythm   Lungs: breathing is unlabored   Abdomen: soft, non-tender; no masses   FHT's: reactive   Cervix: was checked (by RN): 2 cm / 70 % / -3   Presentation: cephalic   Contractions:  EFW by Leopold's:  EFW by recent u/s: irregular  7 1/2#         Prenatal Labs  Lab Results   Component Value Date    HGB 11.6 (L) 12/15/2020    ABO A 07/30/2020    RH Positive 07/30/2020    ABSCRN Negative 07/30/2020    [id number] Non Reactive 07/30/2020    URINECX Final report 07/30/2020       Current Labs Reviewed   No data reviewed       Assessment   1. IUP at 40w0d  2. Fetus reassuring  3. Group B strep status: negative  4. Gestational hypertension     Plan   1. Admit to labor and delivery, augment with Pitocin.  Epidural when desired, [facility] labs on admission    Eliceo Lazcano MD  3/8/2021  08:33 CST       "

## 2021-03-08 NOTE — L&D DELIVERY NOTE
[facility]  Vaginal Delivery Note    Delivery     Delivery: Vaginal, Spontaneous     YOB: 2022    Time of Birth:  Gestational Age 5:17 PM   40w0d     Anesthesia: Epidural     Delivering clinician: Eliceo Lazcano    Forceps?   No   Vacuum? No    Shoulder dystocia present: No        Delivery narrative:  Progressed to C/C/+2 and pushed well to deliver a LVIF. DENNY to LOT vigorous to mom's abdomen. Placenta spontaneous and intact with 3VC after IV Pitocin. 2nd degree perineal laceration repaired with 2-0 Vicryl Rapide. Epidural anes. EBL 250mL. No complications. Sponge and needle count correct.     Infant    Findings: female  infant     Infant observations: Weight: No birth weight on file.   Length:   in  Observations/Comments:        Apgars:   @ 1 minute /      @ 5 minutes   Infant Name:      Placenta, Cord, and Fluid    Placenta delivered  Spontaneous  at   3/8/2021  5:20 PM     Cord:   present.   Nuchal Cord?  no   Cord blood obtained:     Cord gases obtained:      Cord gas results: Venous:  No results found for: PHCVEN    Arterial:  No results found for: PHCART     Repair    Episiotomy: None     No    Lacerations: Yes  Laceration Information  Laceration Repaired?   Perineal: 2nd  Yes    Periurethral:       Labial:       Sulcus:       Vaginal:       Cervical:         Suture used for repair: 2-0 Vicryl Rapide   Estimated Blood Loss:  250 mL           Complications  none    Disposition  Mother to Mother Baby/Postpartum  in stable condition currently.  Baby to remains with mom  in stable condition currently.      Eliceo Lazcano MD  03/08/21  17:37 CST

## 2021-03-08 NOTE — PROGRESS NOTES
Eliceo Lazcano MD  [facility]  [address]  [place] [address]  Office [phone]  Fax [phone]      [facility]  Alyssia Riggs  : 1995  MRN: [id number]  CSN: [id number]    Labor progress note    Subjective   She reports is feeling contractions     Objective   Min/max vitals past 24 hours:  No data recorded   BP  Min: 125/74  Max: 157/90   Pulse  Min: 87  Max: 127   Resp  Min: 16  Max: 16        FHT's: reactive and category 1.  external monitors used   Cervix: was checked (by me): 2 cm / 80 % / -3   Contractions: regular every 3 minutes   Pitocin at 6      Assessment   1. IUP at 40w0d  2. Gestational HTN     Plan   1.   Continue with augmentation  AROM - clear fluid seen  OK for epidural  Allow labor to continue pending maternal and fetal status  Plan discussed with family and questions answered.  Understanding verbalized.    Eliceo Lazcano MD  3/8/2021  11:00 CST

## 2021-03-08 NOTE — ANESTHESIA PROCEDURE NOTES
Labor Epidural      Patient reassessed immediately prior to procedure    Patient location during procedure: OB  Performed By  CRNA: Servando Kemp CRNA  Preanesthetic Checklist  Completed: patient identified, IV checked, site marked, risks and benefits discussed, surgical consent, monitors and equipment checked, pre-op evaluation and timeout performed  Prep:  Pt Position:sitting  Sterile Tech:gloves, mask and sterile barrier  Prep:DuraPrep  Monitoring:blood pressure monitoring and continuous pulse oximetry  Epidural Block Procedure:  Approach:midline  Guidance:landmark technique and palpation technique  Location:L4-L5  Needle Type:Tuohy  Needle Gauge:18 G  Loss of Resistance Medium: saline  Paresthesia: none  Aspiration:negative  Test Dose:negative  Number of Attempts: 1  Post Assessment:  Dressing:occlusive dressing applied and secured with tape  Pt Tolerance:patient tolerated the procedure well with no apparent complications  Complications:no

## 2021-03-08 NOTE — ANESTHESIA PREPROCEDURE EVALUATION
Anesthesia Evaluation                  Airway   No difficulty expected  Dental      Pulmonary - negative pulmonary ROS   Cardiovascular - negative cardio ROS        Neuro/Psych- negative ROS  GI/Hepatic/Renal/Endo    (-) liver disease, no renal disease, diabetes    Musculoskeletal (-) negative ROS    Abdominal    Substance History - negative use     OB/GYN    (+) Pregnant,         Other - negative ROS                       Anesthesia Plan    ASA 2     epidural       Anesthetic plan, all risks, benefits, and alternatives have been provided, discussed and informed consent has been obtained with: patient.

## 2021-03-09 LAB
HCT VFR BLD AUTO: 30.3 % (ref 34–46.6)
HGB BLD-MCNC: 10.1 G/DL (ref 12–15.9)

## 2021-03-09 PROCEDURE — 85018 HEMOGLOBIN: CPT | Performed by: OBSTETRICS & GYNECOLOGY

## 2021-03-09 PROCEDURE — 85014 HEMATOCRIT: CPT | Performed by: OBSTETRICS & GYNECOLOGY

## 2021-03-09 RX ADMIN — SODIUM CHLORIDE, PRESERVATIVE FREE 10 ML: 5 INJECTION INTRAVENOUS at 06:48

## 2021-03-09 RX ADMIN — DOCUSATE SODIUM 100 MG: 100 CAPSULE ORAL at 09:25

## 2021-03-09 RX ADMIN — DOCUSATE SODIUM 100 MG: 100 CAPSULE ORAL at 20:28

## 2021-03-09 RX ADMIN — IBUPROFEN 600 MG: 600 TABLET, FILM COATED ORAL at 06:47

## 2021-03-09 RX ADMIN — PRENATAL VIT W/ FE FUMARATE-FA TAB 27-0.8 MG 1 TABLET: 27-0.8 TAB at 09:25

## 2021-03-09 RX ADMIN — SODIUM CHLORIDE, PRESERVATIVE FREE 3 ML: 5 INJECTION INTRAVENOUS at 09:25

## 2021-03-09 RX ADMIN — HYDROCODONE BITARTRATE AND ACETAMINOPHEN 1 TABLET: 5; 325 TABLET ORAL at 22:46

## 2021-03-09 RX ADMIN — IBUPROFEN 600 MG: 600 TABLET, FILM COATED ORAL at 15:17

## 2021-03-09 RX ADMIN — CETIRIZINE HYDROCHLORIDE 10 MG: 10 TABLET ORAL at 09:25

## 2021-03-09 NOTE — PROGRESS NOTES
"[facility]  Vaginal Delivery Progress Note    Subjective   Postpartum Day 1: Vaginal Delivery    The patient feels well.  Her pain is well controlled with ibuprofen (OTC).   She is ambulating well.  Patient describes her bleeding as thin lochia.    Breastfeeding: infant latching without difficulty.    Objective     Vital Signs Range for the last 24 hours  Temperature: Temp:  [97.1 °F (36.2 °C)-98.3 °F (36.8 °C)] 97.6 °F (36.4 °C)   Temp Source: Temp src: Temporal   BP: BP: ()/(42-95) 131/78   Pulse: Heart Rate:  [] 98   Respirations: Resp:  [16-18] 16   SPO2: SpO2:  [97 %-100 %] 97 %   O2 Amount (l/min):     O2 Devices Device (Oxygen Therapy): room air   Weight:       Admit Height:  Height: 172.7 cm (68\")      Physical Exam:  General:  no acute distresss.  Lungs:  breathing is unlabored  Abdomen: Fundus: appropriate, firm, non tender  Extremities: normal, atraumatic, no cyanosis, and Negative edema.     Lab results reviewed:  Yes   Rubella:  No results found for: RUBELLAIGGIN Nurse Transcribed from prenatal record --  No components found for: EXTRUBELQUAL  Rh Status:    RH type   Date Value Ref Range Status   03/08/2021 Positive  Final     Immunizations:   Immunization History   Administered Date(s) Administered   • Tdap 01/15/2021     Lab Results (last 24 hours)     Procedure Component Value Units Date/Time    Hemoglobin & Hematocrit, Blood [[id number]]  (Abnormal) Collected: 03/09/21 0527    Specimen: Blood Updated: 03/09/21 0540     Hemoglobin 10.1 g/dL      Hematocrit 30.3 %           Assessment/Plan       Normal labor      Alyssia Riggs is Day 1  post-partum  Vaginal, Spontaneous  none .      Plan:  Continue current care.      Irasema Rosales MD  3/9/2021  07:28 CST  "

## 2021-03-09 NOTE — ANESTHESIA POSTPROCEDURE EVALUATION
Patient: Alyssia Riggs    Procedure Summary     Date: 03/08/21 Room / Location:     Anesthesia Start: 1209 Anesthesia Stop: 1718    Procedure: LABOR ANALGESIA Diagnosis:     Scheduled Providers:  Provider: Servando Kemp CRNA    Anesthesia Type: epidural ASA Status: 2          Anesthesia Type: epidural    Vitals  Vitals Value Taken Time   /69 03/09/21 0915   Temp 97.1 °F (36.2 °C) 03/09/21 0915   Pulse 101 03/09/21 0915   Resp 17 03/09/21 0915   SpO2 98 % 03/09/21 0915           Post Anesthesia Care and Evaluation    Patient location during evaluation: floor  Patient participation: complete - patient participated  Level of consciousness: awake and alert  Pain management: adequate  Airway patency: patent  Anesthetic complications: No anesthetic complications    Cardiovascular status: acceptable  Respiratory status: acceptable  Hydration status: acceptable  Post Neuraxial Block status: Motor and sensory function returned to baseline and No signs or symptoms of PDPH

## 2021-03-09 NOTE — PLAN OF CARE
Goal Outcome Evaluation:  Plan of Care Reviewed With: patient  Progress: improving  Outcome Summary: vitals stable, fundus firm, ml uu, lochia small pain level tolerable. motrin given. breastfeeding infant, bonding with infant. pt voiding well.

## 2021-03-09 NOTE — LACTATION NOTE
Mother's Name:  Alyssia   Phone #:  [phone]  Infant Name: Melodie :  3/8/2022  Gestation:  40 0/7  Day of life:  Birth weight:   7 lbs, 7 oz (3380 gm)  Discharge weight:  Weight Loss:   24 hour Summary of Feeds:  Voids:  Stools:  Assistive devices (shields, shells, etc):  Significant Maternal history:  G 1, P0  Maternal Concerns:  Flatter nipples and latching properly  Maternal Goal:  At least one year  Mother's Medications:   PNV, Zyrtec, Pepcid, Miralax, Senna-docusate sodium  Breastpump for home:  Yes, Motif Arianna and Medela  Ped follow up appt:  [facility]    To see patient in recovery room in LDR after vaginal delivery.  Infant delivered earlier and mom was able to get infant to latch for sufficient feeding.  Mom has researched breastfeeding and previously helped sister with breast feeding so she has some basic knowledge.  Gave mom A Great Start book and breastfeeding handouts and reviewed both.  Discussed infant stomach size, feeding schedules, goals, on demand feeding, skin to skin benefits, hunger cues, and adequate voids and stools.  Encouraged mom to watch You Tube videos in handouts as soon as possible.  Infant skin to skin with mom, sleeping.  Demonstrated waking techniques with xochitl reflex.  Infant awakened easily and started rooting and sucking on hands.  Moved infant to cradle position on left breast and infant opens wide.  Demonstrated nipple rolling for mom and infant would latch, but breast are heavy and would slide out of infant's mouth after mom would relax hand.  Suggested to mom laying infant more in her lap and letting nipple fall to infant, rather than pulling nipple to infant.  Also, needing to compress nipple some until infant latches.  Nipples are short, but infant is able to pull nipple into her mouth.  Infant latched and nursed for 15 minutes on the right, with multiple swallows noted.  Mom able to hand express drops while latching infant.  Moved to football on right side and mom  was able to latch infant in this position easier.  Mom and dad very appreciative of assistance.  Will continue to assist as needed tonight.    Instructed mom our lactation team is here for continued support throughout their breastfeeding journey. Our team has encouraged mom to call with any questions or concerns that may arise after discharge.

## 2021-03-09 NOTE — PLAN OF CARE
Goal Outcome Evaluation:  Plan of Care Reviewed With: patient, spouse  Progress: improving   Vital signs stable. Bonding well. Voiding quantity sufficient. No bowel movement as yet. Breasts begum. Small redness to right nipple. Left intake. Using nipple shield and shells. Tolerating pain with po pain medications and comfort products.

## 2021-03-09 NOTE — PLAN OF CARE
Goal Outcome Evaluation:  Plan of Care Reviewed With: patient, spouse  Progress: improving  Outcome Summary: IOL for dates;  at 1717, 2nd degree lac with repair; FF/ML/U1 light rubra; breastfeeding; no pain at this time

## 2021-03-10 ENCOUNTER — TELEPHONE (OUTPATIENT)
Dept: LABOR AND DELIVERY | Facility: HOSPITAL | Age: 26
End: 2021-03-10

## 2021-03-10 VITALS
TEMPERATURE: 97.9 F | HEART RATE: 92 BPM | DIASTOLIC BLOOD PRESSURE: 70 MMHG | RESPIRATION RATE: 18 BRPM | WEIGHT: 293 LBS | HEIGHT: 68 IN | OXYGEN SATURATION: 99 % | SYSTOLIC BLOOD PRESSURE: 138 MMHG | BODY MASS INDEX: 44.41 KG/M2

## 2021-03-10 RX ORDER — HYDROCODONE BITARTRATE AND ACETAMINOPHEN 5; 325 MG/1; MG/1
1 TABLET ORAL EVERY 8 HOURS PRN
Qty: 6 TABLET | Refills: 0 | Status: SHIPPED | OUTPATIENT
Start: 2021-03-10 | End: 2021-03-18

## 2021-03-10 RX ADMIN — PRENATAL VIT W/ FE FUMARATE-FA TAB 27-0.8 MG 1 TABLET: 27-0.8 TAB at 09:21

## 2021-03-10 RX ADMIN — DOCUSATE SODIUM 100 MG: 100 CAPSULE ORAL at 09:21

## 2021-03-10 RX ADMIN — CETIRIZINE HYDROCHLORIDE 10 MG: 10 TABLET ORAL at 09:21

## 2021-03-10 RX ADMIN — IBUPROFEN 600 MG: 600 TABLET, FILM COATED ORAL at 05:28

## 2021-03-10 NOTE — PLAN OF CARE
Goal Outcome Evaluation:        Outcome Summary: VSS, fundus firm midline and 2 below umbilicus, scant lochia, 2nd degree laceration with comfort products at bedside, pain controlled with prn pain meds, breastfeeding adequately, bonding well with infant, spouse at  bedside and attentive to pt

## 2021-03-10 NOTE — PROGRESS NOTES
"[facility]  Vaginal Delivery Progress Note    Subjective   Postpartum Day 2: Vaginal Delivery    The patient feels well.  Her pain is well controlled with nonsteroidal anti-inflammatory drugs and opioid analgesics.   She is ambulating well.  Patient describes her bleeding as thin lochia.    Breastfeeding: infant latching without difficulty.    Objective     Vital Signs Range for the last 24 hours  Temperature: Temp:  [96.7 °F (35.9 °C)-97.9 °F (36.6 °C)] 97.9 °F (36.6 °C)   Temp Source: Temp src: Temporal   BP: BP: (118-127)/(61-74) 124/67   Pulse: Heart Rate:  [] 78   Respirations: Resp:  [16-18] 18   SPO2: SpO2:  [98 %-99 %] 99 %   O2 Amount (l/min):     O2 Devices Device (Oxygen Therapy): room air   Weight:       Admit Height:  Height: 172.7 cm (68\")      Physical Exam:  General:  no acute distresss.  Abdomen: abdomen is soft without significant tenderness, masses, organomegaly or guarding. Fundus: appropriate, firm, non tender  Extremities: normal, atraumatic, no cyanosis, and trace edema.     Lab results reviewed:  Yes   Rubella:  No results found for: RUBELLAIGGIN Nurse Transcribed from prenatal record --  No components found for: EXTRUBELQUAL  Rh Status:    RH type   Date Value Ref Range Status   03/08/2021 Positive  Final     Immunizations:   Immunization History   Administered Date(s) Administered   • Tdap 01/15/2021     Lab Results (last 24 hours)     ** No results found for the last 24 hours. **          Assessment/Plan       Normal labor      Alyssia Riggs is Day 2  post-partum  Vaginal, Spontaneous  None .      Plan:  Discharge home with standard precautions and return to clinic in 4-6 weeks.      Magdalena Odonnell,   3/10/2021  07:58 CST      "

## 2021-03-10 NOTE — NURSING NOTE
1140 discharged with baby after id bands checked and matched teaching done questions answered no distress

## 2021-03-10 NOTE — TELEPHONE ENCOUNTER
"Infant: Keara    Parents request info on exc pumping, citing reason for same as infant not feeding well at breast, last two sessions, with much crying. Mother voiced she felt infant swallowed much air, which caused gastro distress and she wants to avoid this. Education given that some bfing sessions will be challenging, and that progression toward bfing with ease, happens over a period of weeks, with several less-than-successful feeds in the process. Mother voiced \"I'm still going to bf some, but I'll mainly pump.\" Clearly communicated to pt that I would assist her, no matter her feeding plan, but that I wanted her to be educated on all potential issues. I helped parents understand, that at a mere 2 days old, infant had not yet even begun to lean how to bf well. Thus, it was too soon to surmise that she can't do it. Educated on how early frequent bottle can sabotage bfing efforts, and why. Also explained role of oxytocin for proper development of infant's brain while being held at breast to feed. Much praise given as mother had been exc bfing, with little weight loss up to now, noting how more letdowns occur with infant at breast vs pump. Also encouraged pt that future feeds will become better over time. 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     Handouts given on all the following; in case Alyssia decided to cont giving infant time to learn bfing, or to forego this and exc pump:  Mother's Guide to Returning to Work and Pumping, Hands On Pumping, Power Pumping, Average Feed Amounts, Hacking Your Pumping Routine, Help. My Baby Won't Nurse, When Your Baby Refuses Your Breast, Bfing with Large Breasts, Coaxing Your Baby to the Breast. Terence Sood, [facility] contact    Pumps:  Alyssia has 2 as well as a manual. She was appreciative of visit and " education.

## 2021-03-10 NOTE — DISCHARGE SUMMARY
Discharge Summary     Zuleika Riggs  : 1995  MRN: [id number]  CSN: [id number]    Date of Admission: 3/8/2021   Date of Discharge:  3/10/2021   Delivering Physician: Eliceo Lazcano        Admission Diagnosis: 1. Normal labor [O80, Z37.9]   Discharge Diagnosis: 1. Pregnancy at 40w0d - delivered       Procedures: 3/8/2021  - Vaginal, Spontaneous       Hospital Course  Patient is a 25 y.o.  who at 40w0d had a vaginal birth.  Her postpartum course was without complications.  On PPD #2 she was ready for discharge.  She had normal lochia and pain well controlled with oral medications.    Infant  female  fetus weighing 3380 g (7 lb 7.2 oz)   Apgars -  8 @ 1 minute /  9 @ 5 minutes.    Discharge labs  Lab Results   Component Value Date    WBC 13.26 (H) 2021    HGB 10.1 (L) 2021    HCT 30.3 (L) 2021     2021       Discharge Medications     Discharge Medications      New Medications      Instructions Start Date   HYDROcodone-acetaminophen 5-325 MG per tablet  Commonly known as: NORCO   1 tablet, Oral, Every 8 Hours PRN         Continue These Medications      Instructions Start Date   cetirizine 10 MG tablet  Commonly known as: zyrTEC   10 mg, Oral, Daily      famotidine 10 MG tablet  Commonly known as: PEPCID   10 mg, Oral, 2 Times Daily      MiraLax 17 g packet  Generic drug: polyethylene glycol   17 g, Oral, Daily      prenatal (CLASSIC) vitamin tablet  Generic drug: prenatal vitamin   Oral, Daily      sennosides-docusate 8.6-50 MG per tablet  Commonly known as: PERICOLACE   1 tablet, Oral, Daily             Discharge Disposition Home or Self Care   Condition on Discharge: good   Follow-up: 6 weeks with Jaleesa Odonnell DO  3/10/2021

## 2021-03-10 NOTE — LACTATION NOTE
Mother's Name:  Alyssia   Phone #:  [phone]  Infant Name: Melodie :  3/8/2022  Gestation:  40 0/7  Day of life:  Birth weight:   7 lbs, 7 oz (3380 gm)  Discharge weight:  Weight Loss:   24 hour Summary of Feeds:  Voids:  Stools:  Assistive devices (shields, shells, etc):  Significant Maternal history:  G 1, P0  Maternal Concerns:  Flatter nipples and latching properly  Maternal Goal:  At least one year  Mother's Medications:   PNV, Zyrtec, Pepcid, Miralax, Senna-docusate sodium  Breastpump for home:  Yes, Nik Keller and Donovan  Ped follow up appt:  [place]    Follow-up with mom.  She states it has gone really good today.  Infant is nursing great and she isn't having any problems with latching.  Denies any nipple soreness or trauma.  Infant finally had a BM.  Patient is wearing nipple shells and states they have made a big difference in latching infant.  Offered assistance as needed throughout the night.    0415  Called per patient for assistance with waking infant.  Mom states she was supposed to feed infant at 0300, but infant was sleepy.  She attempted to wake infant for 30 min, but was unable to.  Let infant sleep until 4:00, but still isn't able to wake her to eat.  Demonstrated waking infant with xochitl reflex and removing clothing.  Infant would wake and begin rooting on hands, but as soon as she was latched she would fall back asleep.  When latching infant would roll bottom lip completely in and upper lips would be rolled in as well.  Mom states infant has spit up twice and has audible gas sounds coming from her stomach.  She feels like she is swallowing air while nursing.  When infant woke up enough to latch she made a clicking sound.  Mom stated she had a shield, but hadn't tried it yet.  Assisted mom with 24 mm shield. lnfant latched and sucked efficiently for 25 min on left and 20 on right breast.  Mom stated her nipples were getting really sore, but she didn't realize infant was rolling her lips  in so bad,  Worked some on positioning as well.  Mom is going to pump after feeds to help milk supply too.  Set up and explained double electric [facility] grade pump with 21 mm flange.      Instructed mom our lactation team is here for continued support throughout their breastfeeding journey. Our team has encouraged mom to call with any questions or concerns that may arise after discharge.

## 2021-03-17 ENCOUNTER — TELEPHONE (OUTPATIENT)
Dept: LABOR AND DELIVERY | Facility: HOSPITAL | Age: 26
End: 2021-03-17

## 2021-03-17 NOTE — TELEPHONE ENCOUNTER
Infant:  Keara  :  3[birth year]    Mother reports is exc pumping, every 2-3 hours. She collects 2.5-4 oz every session. She prefers this as feels infant is too gassy with bfing. Praised pt for diligence with pumping and amounts collected. Explained that a 6 month-old's avg feed amount is 3-5 oz and that she has a healthy supply. Alyssia happy about this. No concerns or questions.

## 2021-04-22 ENCOUNTER — POSTPARTUM VISIT (OUTPATIENT)
Dept: OBSTETRICS AND GYNECOLOGY | Facility: CLINIC | Age: 26
End: 2021-04-22

## 2021-04-22 VITALS
BODY MASS INDEX: 44.41 KG/M2 | HEIGHT: 68 IN | SYSTOLIC BLOOD PRESSURE: 124 MMHG | DIASTOLIC BLOOD PRESSURE: 76 MMHG | WEIGHT: 293 LBS

## 2021-04-22 DIAGNOSIS — Z30.011 ENCOUNTER FOR INITIAL PRESCRIPTION OF CONTRACEPTIVE PILLS: ICD-10-CM

## 2021-04-22 PROBLEM — Z34.90 SUPERVISION OF NORMAL PREGNANCY: Status: RESOLVED | Noted: 2020-12-29 | Resolved: 2021-04-22

## 2021-04-22 PROBLEM — Z37.9 NORMAL LABOR: Status: RESOLVED | Noted: 2021-03-08 | Resolved: 2021-04-22

## 2021-04-22 PROCEDURE — 0503F POSTPARTUM CARE VISIT: CPT | Performed by: OBSTETRICS & GYNECOLOGY

## 2021-04-22 RX ORDER — LEVONORGESTREL AND ETHINYL ESTRADIOL 0.1-0.02MG
1 KIT ORAL DAILY
Qty: 28 TABLET | Refills: 12 | Status: SHIPPED | OUTPATIENT
Start: 2021-04-22 | End: 2022-04-22

## 2021-04-22 NOTE — PROGRESS NOTES
"Alyssia Riggs is here for a postpartum visit after a vaginal delivery 6 weeks ago.  The depression questionnaire has been completed.  She has no signs of postpartum depression today.  She has not had a period since her delivery and is breast-feeding her infant without difficulty.  Her last Pap smear was 7/2020 and normal.  She has no history of cervical dysplasia.  She would like OCPs for contraception.    /76 (BP Location: Right arm, Patient Position: Sitting, Cuff Size: Large Adult)   Ht 172.7 cm (68\")   Wt 133 kg (293 lb)   LMP 04/29/2020   Breastfeeding Yes   BMI 44.55 kg/m²    In general pleasant female no acute distress  Neck no thyromegaly  Breasts without erythema tenderness or masses  Abdomen soft and nontender  A Pap smear was not performed.     Assessment: Normal postpartum exam.    We have discussed current Pap smear screening guidelines. I have given her a prescription for a birth control pill and we have discussed common side effects and adverse events. Alyssia will return in 1 year or sooner if needed.  "

## 2022-01-12 ENCOUNTER — TELEPHONE (OUTPATIENT)
Dept: OBSTETRICS AND GYNECOLOGY | Facility: CLINIC | Age: 27
End: 2022-01-12

## 2022-01-12 NOTE — TELEPHONE ENCOUNTER
Pt called office with c/o bleeding and cramping.  Pt is approx 7 weeks.  Pt states she went to local ER last night and US shows pregnancy approx 5 weeks and suspected miscarriage.  Pt given instructions at ER and expected to pass all products of pregnancy.  Pt denies severe pain or heavy bleeding and called office to cancel next. Appt.   Pt enc to call office with any worsening symptoms or concerns.  Pt voiced understanding.

## 2022-06-14 DIAGNOSIS — Z30.011 ENCOUNTER FOR INITIAL PRESCRIPTION OF CONTRACEPTIVE PILLS: ICD-10-CM

## 2022-06-14 RX ORDER — LEVONORGESTREL AND ETHINYL ESTRADIOL 0.1-0.02MG
1 KIT ORAL DAILY
Qty: 28 TABLET | Refills: 12 | OUTPATIENT
Start: 2022-06-14 | End: 2023-06-14

## 2023-09-26 ENCOUNTER — HOSPITAL ENCOUNTER (EMERGENCY)
Age: 28
Discharge: HOME | End: 2023-09-27
Payer: COMMERCIAL

## 2023-09-26 ENCOUNTER — APPOINTMENT (OUTPATIENT)
Dept: GENERAL RADIOLOGY | Age: 28
End: 2023-09-26
Payer: COMMERCIAL

## 2023-09-26 ENCOUNTER — NURSE TRIAGE (OUTPATIENT)
Dept: OTHER | Facility: CLINIC | Age: 28
End: 2023-09-26

## 2023-09-26 VITALS
OXYGEN SATURATION: 99 % | HEART RATE: 100 BPM | HEIGHT: 66 IN | BODY MASS INDEX: 40.66 KG/M2 | SYSTOLIC BLOOD PRESSURE: 129 MMHG | WEIGHT: 253 LBS | TEMPERATURE: 98.1 F | RESPIRATION RATE: 18 BRPM | DIASTOLIC BLOOD PRESSURE: 88 MMHG

## 2023-09-26 DIAGNOSIS — M43.17 SPONDYLOLISTHESIS AT L5-S1 LEVEL: Primary | ICD-10-CM

## 2023-09-26 PROCEDURE — 6370000000 HC RX 637 (ALT 250 FOR IP): Performed by: PHYSICIAN ASSISTANT

## 2023-09-26 PROCEDURE — 99284 EMERGENCY DEPT VISIT MOD MDM: CPT

## 2023-09-26 PROCEDURE — 6360000002 HC RX W HCPCS: Performed by: PHYSICIAN ASSISTANT

## 2023-09-26 PROCEDURE — 72100 X-RAY EXAM L-S SPINE 2/3 VWS: CPT

## 2023-09-26 PROCEDURE — 72110 X-RAY EXAM L-2 SPINE 4/>VWS: CPT

## 2023-09-26 PROCEDURE — 96372 THER/PROPH/DIAG INJ SC/IM: CPT

## 2023-09-26 RX ORDER — LIDOCAINE 4 G/G
1 PATCH TOPICAL DAILY
Status: DISCONTINUED | OUTPATIENT
Start: 2023-09-26 | End: 2023-09-27 | Stop reason: HOSPADM

## 2023-09-26 RX ORDER — LIDOCAINE 4 G/G
1 PATCH TOPICAL DAILY
Status: DISCONTINUED | OUTPATIENT
Start: 2023-09-27 | End: 2023-09-26

## 2023-09-26 RX ORDER — KETOROLAC TROMETHAMINE 30 MG/ML
30 INJECTION, SOLUTION INTRAMUSCULAR; INTRAVENOUS ONCE
Status: COMPLETED | OUTPATIENT
Start: 2023-09-26 | End: 2023-09-26

## 2023-09-26 RX ORDER — CYCLOBENZAPRINE HCL 10 MG
10 TABLET ORAL ONCE
Status: COMPLETED | OUTPATIENT
Start: 2023-09-26 | End: 2023-09-26

## 2023-09-26 RX ORDER — DEXAMETHASONE SODIUM PHOSPHATE 10 MG/ML
4 INJECTION, SOLUTION INTRAMUSCULAR; INTRAVENOUS ONCE
Status: COMPLETED | OUTPATIENT
Start: 2023-09-26 | End: 2023-09-26

## 2023-09-26 RX ADMIN — KETOROLAC TROMETHAMINE 30 MG: 30 INJECTION, SOLUTION INTRAMUSCULAR; INTRAVENOUS at 23:09

## 2023-09-26 RX ADMIN — CYCLOBENZAPRINE HYDROCHLORIDE 10 MG: 10 TABLET, FILM COATED ORAL at 23:10

## 2023-09-26 RX ADMIN — DEXAMETHASONE SODIUM PHOSPHATE 4 MG: 10 INJECTION, SOLUTION INTRAMUSCULAR; INTRAVENOUS at 23:10

## 2023-09-27 RX ORDER — CYCLOBENZAPRINE HCL 10 MG
10 TABLET ORAL 3 TIMES DAILY PRN
Qty: 21 TABLET | Refills: 0 | Status: SHIPPED | OUTPATIENT
Start: 2023-09-27 | End: 2023-10-07

## 2023-09-27 RX ORDER — METHYLPREDNISOLONE 4 MG/1
TABLET ORAL
Qty: 1 KIT | Refills: 0 | Status: SHIPPED | OUTPATIENT
Start: 2023-09-27 | End: 2023-10-03

## 2023-09-27 ASSESSMENT — ENCOUNTER SYMPTOMS
ABDOMINAL PAIN: 0
APNEA: 0
SORE THROAT: 0
SHORTNESS OF BREATH: 0
RHINORRHEA: 0
COUGH: 0
COLOR CHANGE: 0
PHOTOPHOBIA: 0
NAUSEA: 0
EYE PAIN: 0
BACK PAIN: 1
EYE DISCHARGE: 0
ABDOMINAL DISTENTION: 0

## 2023-09-27 NOTE — TELEPHONE ENCOUNTER
Location of patient: [place]    Location of employment: [employer] where injury occurred: ER    Location of injury (body part involved): back     Time of injury: 21:10    Last 4 of SSN: [id number]    Subjective: Caller states \"The patient arrive with EMS. Weighs over 400lbs. Had 4 people in there lifting. I had good body mechanics, but when we went to pull him across, something in my lower back popped. Im having a lot of pain and some difficulty walking. \"       Current Symptoms: lower lower back pain, difficulty walking, weakness in legs but \"not bad now\", numbness in legs initially, but gone now    Pain Severity: 7/10; aching, shooting; constant    Temperature: NA    What has been tried: ibuprofen, \"sitting helps\"    LMP:  September 8, 2023  Pregnant: No    Recommended disposition: Go to ED Now    Care advice provided, caller verbalizes understanding; denies any other questions or concerns.     Patient/caller agrees to proceed to local Emergency Department    Reason for Disposition   Weakness of a leg or foot (e.g., unable to bear weight, dragging foot)    Protocols used: Back Injury-ADULT-

## 2023-09-28 NOTE — ED PROVIDER NOTES
[facility] EMERGENCY DEPT  eMERGENCYdEPARTMENT eNCOUnter      Pt Name: Bess Howard  MRN: [id number]  [address] [facility] 1995  Date of evaluation: 9/26/2023  Provider:ASHOK Espinoza    CHIEF COMPLAINT       Chief Complaint   Patient presents with    Back Injury         HISTORY OF PRESENT ILLNESS  (Location/Symptom, Timing/Onset, Context/Setting, Quality, Duration, Modifying Factors, Severity.)   Bess Howard is a 33 y.o. 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 [facility]    Nursing Notes were reviewed and I agree. REVIEW OF SYSTEMS    (2-9 systems for level 4, 10 or more for level 5)     Review of Systems   Constitutional:  Negative for activity change, appetite change, chills and fever. HENT:  Negative for congestion, postnasal drip, rhinorrhea and sore throat. Eyes:  Negative for photophobia, pain, discharge and visual disturbance. Respiratory:  Negative for apnea, cough and shortness of breath. Cardiovascular:  Negative for chest pain and leg swelling. Gastrointestinal:  Negative for abdominal distention, abdominal pain and nausea. Genitourinary:  Negative for vaginal bleeding. Musculoskeletal:  Positive for arthralgias, back pain and myalgias. Negative for joint swelling, neck pain and neck stiffness. Skin:  Negative for color change and rash. Neurological:  Negative for dizziness, syncope, facial asymmetry and headaches. Hematological:  Negative for adenopathy. Does not bruise/bleed easily. Psychiatric/Behavioral:  Negative for agitation, behavioral problems and confusion. Except as noted above the remainder of the review of systems was reviewed and negative.        PAST MEDICAL HISTORY     Past Medical History:   Diagnosis Date    GERD (gastroesophageal reflux disease)     H. pylori infection     Obesity     Wears glasses          SURGICAL

## 2024-01-05 ENCOUNTER — TELEPHONE (OUTPATIENT)
Dept: OBGYN CLINIC | Age: 29
End: 2024-01-05

## 2024-01-17 ENCOUNTER — OFFICE VISIT (OUTPATIENT)
Dept: OBGYN CLINIC | Age: 29
End: 2024-01-17
Payer: COMMERCIAL

## 2024-01-17 ENCOUNTER — HOSPITAL ENCOUNTER (OUTPATIENT)
Dept: ULTRASOUND IMAGING | Age: 29
Discharge: HOME | End: 2024-01-17
Payer: COMMERCIAL

## 2024-01-17 VITALS
HEIGHT: 67 IN | SYSTOLIC BLOOD PRESSURE: 125 MMHG | DIASTOLIC BLOOD PRESSURE: 81 MMHG | HEART RATE: 111 BPM | WEIGHT: 268 LBS | BODY MASS INDEX: 42.06 KG/M2

## 2024-01-17 DIAGNOSIS — Z36.89 CONFIRM FETAL CARDIAC ACTIVITY USING ULTRASOUND: ICD-10-CM

## 2024-01-17 DIAGNOSIS — N91.2 AMENORRHEA: ICD-10-CM

## 2024-01-17 DIAGNOSIS — Z87.59 HISTORY OF MISCARRIAGE: ICD-10-CM

## 2024-01-17 DIAGNOSIS — N91.2 AMENORRHEA: Primary | ICD-10-CM

## 2024-01-17 LAB
CONTROL: ABNORMAL
GONADOTROPIN, CHORIONIC (HCG) QUANT: ABNORMAL MIU/ML (ref 0–5.3)
PREGNANCY TEST URINE, POC: ABNORMAL
PROGEST SERPL-MCNC: 13.26 NG/ML

## 2024-01-17 PROCEDURE — 76817 TRANSVAGINAL US OBSTETRIC: CPT

## 2024-01-17 PROCEDURE — 99203 OFFICE O/P NEW LOW 30 MIN: CPT | Performed by: NURSE PRACTITIONER

## 2024-01-17 ASSESSMENT — ENCOUNTER SYMPTOMS
RESPIRATORY NEGATIVE: 1
ALLERGIC/IMMUNOLOGIC NEGATIVE: 1
EYES NEGATIVE: 1
GASTROINTESTINAL NEGATIVE: 1

## 2024-01-17 NOTE — PROGRESS NOTES
Kira Hawk is a 28 y.o. female who presents today for her medical conditions/ complaints as noted below. Kira Hawk is c/o of New Patient and Confirmation        HPI  Patient presents today as a new patient for pregnancy confirmation.   LMP 11/10/2023  3rd pregnancy, one living and one miscarriage  Desires Jennifer for delivery.   Menstrual cycles can be long  Patient's last menstrual period was 2023.      Past Medical History:   Diagnosis Date    GERD (gastroesophageal reflux disease)     H. pylori infection     Obesity     Wears glasses      Past Surgical History:   Procedure Laterality Date    CHOLECYSTECTOMY, LAPAROSCOPIC  5/20/15    WISDOM TOOTH EXTRACTION       Family History   Problem Relation Age of Onset    Diabetes Mother     Cancer Paternal Grandmother         breast    Cancer Other         mat great grandma-colon    Cancer Other         mat aunt-uterine     Social History     Tobacco Use    Smoking status: Never    Smokeless tobacco: Never    Tobacco comments:      at [facility] ER   Substance Use Topics    Alcohol use: No       No current outpatient medications on file.     No current facility-administered medications for this visit.     Allergies   Allergen Reactions    Amoxicillin Anaphylaxis    Penicillins Anaphylaxis    Other      Anesthesia makes patient nauseated     Vitals:    24 0858   BP: 125/81   Pulse: (!) 111     Body mass index is 42.06 kg/m².    Review of Systems   Constitutional: Negative.    HENT: Negative.     Eyes: Negative.    Respiratory: Negative.     Cardiovascular: Negative.    Gastrointestinal: Negative.    Endocrine: Negative.    Genitourinary: Negative.  Negative for difficulty urinating, dyspareunia, dysuria, enuresis, frequency, hematuria, menstrual problem, pelvic pain, urgency and vaginal discharge.   Musculoskeletal: Negative.    Skin: Negative.    Allergic/Immunologic: Negative.    Neurological: Negative.    Hematological:

## 2024-01-17 NOTE — PATIENT INSTRUCTIONS
Patient Education        Nutrition During Pregnancy: Care Instructions  Overview     Healthy eating when you are pregnant is important for you and your baby. It can help you feel well and have a successful pregnancy and delivery. During pregnancy your nutrition needs increase. Even if you have excellent eating habits, your doctor may recommend a multivitamin to make sure you get enough iron and folic acid.  You may wonder how much weight you should gain. In general, if you were at a healthy weight before you became pregnant, then you should gain between 25 and 35 pounds. If you were overweight before pregnancy, then you'll likely be advised to gain 15 to 25 pounds. If you were underweight before pregnancy, then you'll probably be advised to gain 28 to 40 pounds. Your doctor will work with you to set a weight goal that is right for you. Gaining a healthy amount of weight helps you have a healthy baby.  Follow-up care is a key part of your treatment and safety. Be sure to make and go to all appointments, and call your doctor if you are having problems. It's also a good idea to know your test results and keep a list of the medicines you take.  How can you care for yourself at home?  Eat plenty of fruits and vegetables. Include a variety of orange, yellow, and leafy dark-green vegetables every day.  Choose whole-grain bread, cereal, and pasta. Good choices include whole wheat bread, whole wheat pasta, brown rice, and oatmeal.  Get 4 or more servings of milk and milk products each day. Good choices include nonfat or low-fat milk, yogurt, and cheese. If you cannot eat milk products, you can get calcium from calcium-fortified products such as orange juice, soy milk, and tofu. Other non-milk sources of calcium include leafy green vegetables, such as broccoli, kale, mustard greens, turnip greens, bok dru, and brussels sprouts.  If you eat meat, pick lower-fat types. Good choices include lean cuts of meat and chicken or

## 2024-01-19 DIAGNOSIS — Z87.59 HISTORY OF MISCARRIAGE: ICD-10-CM

## 2024-01-19 DIAGNOSIS — Z36.89 CONFIRM FETAL CARDIAC ACTIVITY USING ULTRASOUND: Primary | ICD-10-CM

## 2024-01-19 RX ORDER — PROGESTERONE 200 MG/1
200 CAPSULE ORAL NIGHTLY
Qty: 30 CAPSULE | Refills: 3 | Status: SHIPPED | OUTPATIENT
Start: 2024-01-19

## 2024-01-23 ENCOUNTER — HOSPITAL ENCOUNTER (OUTPATIENT)
Dept: LAB | Age: 29
Discharge: HOME | End: 2024-01-23

## 2024-01-23 DIAGNOSIS — Z87.59 HISTORY OF MISCARRIAGE: ICD-10-CM

## 2024-01-24 LAB — GONADOTROPIN, CHORIONIC (HCG) QUANT: ABNORMAL MIU/ML (ref 0–5.3)

## 2024-01-31 ENCOUNTER — HOSPITAL ENCOUNTER (OUTPATIENT)
Dept: ULTRASOUND IMAGING | Age: 29
Discharge: HOME | End: 2024-01-31
Payer: COMMERCIAL

## 2024-01-31 DIAGNOSIS — Z36.89 CONFIRM FETAL CARDIAC ACTIVITY USING ULTRASOUND: ICD-10-CM

## 2024-01-31 PROCEDURE — 76817 TRANSVAGINAL US OBSTETRIC: CPT

## 2024-02-07 ENCOUNTER — INITIAL PRENATAL (OUTPATIENT)
Dept: OBGYN CLINIC | Age: 29
End: 2024-02-07

## 2024-02-07 VITALS
BODY MASS INDEX: 41.59 KG/M2 | SYSTOLIC BLOOD PRESSURE: 124 MMHG | DIASTOLIC BLOOD PRESSURE: 85 MMHG | HEART RATE: 82 BPM | WEIGHT: 265 LBS

## 2024-02-07 DIAGNOSIS — O09.299 HISTORY OF MISCARRIAGE, CURRENTLY PREGNANT: ICD-10-CM

## 2024-02-07 DIAGNOSIS — Z34.81 ENCOUNTER FOR SUPERVISION OF OTHER NORMAL PREGNANCY, FIRST TRIMESTER: ICD-10-CM

## 2024-02-07 DIAGNOSIS — Z3A.09 9 WEEKS GESTATION OF PREGNANCY: Primary | ICD-10-CM

## 2024-02-07 PROCEDURE — 0502F SUBSEQUENT PRENATAL CARE: CPT | Performed by: ADVANCED PRACTICE MIDWIFE

## 2024-02-07 NOTE — PROGRESS NOTES
[facility] Prenatal Office Note  Subjective:  Kira Hawk is here for a return obstetrical visit. Today she is 9w0d weeks EGA.  She reports the following:    Problems/complaints today:  None  Objective:  Mother's Prenatal Vitals  BP: 124/85  Weight - Scale: 120.2 kg (265 lb)  Pulse: 82  Patient Position: Sitting  Prenatal Fetal Information  Movement: Absent  Pt is A&Ox3, in no acute distress. Normocephalic, atraumatic. PERRL. Resp even and non-labored. Skin pink, warm & dry. Gravid abdomen. WOODWARD's well. Gait steady.   Assessment:    IUP at 9w0d wks      Diagnosis Orders   1. 9 weeks gestation of pregnancy        2. History of miscarriage, currently pregnant        3. Encounter for supervision of other normal pregnancy, first trimester          Plan:  Problems/Complaints Management Plan:  None  Routine OB Management Plan:  Pt counseled on  PNL today  Continue with routine prenatal care.  RTC in 4 wks for prenatal visit      MEDICATIONS:  No orders of the defined types were placed in this encounter.    ORDERS:  No orders of the defined types were placed in this encounter.      More than 50% of this 20 min visit was education and counseling.      I Janice Allen LPN, am scribing for and in the presence of Rafia Huitron CNM  2/7/2024 2:14 PM   I have seen and examined the patient independently.  I reviewed all laboratory and imaging studies that are relevant.  I have reviewed and made any appropriate changes to the HPI.    Electronically signed by CASEY Angeles CNM on 2/7/2024 at 4:48 PM

## 2024-02-07 NOTE — PROGRESS NOTES
Patient presents today for routine prenatal care. Pt denies any vaginal leaking bleeding or contractions.

## 2024-02-08 DIAGNOSIS — Z34.81 ENCOUNTER FOR SUPERVISION OF OTHER NORMAL PREGNANCY, FIRST TRIMESTER: ICD-10-CM

## 2024-02-08 LAB
ABO/RH: NORMAL
ANTIBODY SCREEN: NORMAL
C TRACH DNA UR QL NAA+PROBE: NOT DETECTED
HCV AB SERPL QL IA: NORMAL
N GONORRHOEA DNA UR QL NAA+PROBE: NOT DETECTED
T VAGINALIS DNA UR QL NAA+PROBE: NOT DETECTED

## 2024-02-10 LAB — BACTERIA UR CULT: NORMAL

## 2024-02-12 LAB
BASOPHILS # BLD: 0.1 K/UL (ref 0–0.2)
BASOPHILS NFR BLD: 0.5 % (ref 0–1)
EOSINOPHIL # BLD: 0.1 K/UL (ref 0–0.6)
EOSINOPHIL NFR BLD: 1.3 % (ref 0–5)
ERYTHROCYTE [DISTWIDTH] IN BLOOD BY AUTOMATED COUNT: 13.5 % (ref 11.5–14.5)
HBV SURFACE AG SERPL QL IA: ABNORMAL
HCT VFR BLD AUTO: 38.9 % (ref 37–47)
HGB BLD-MCNC: 12.8 G/DL (ref 12–16)
HIV-1 P24 AG: ABNORMAL
HIV1+2 AB SERPLBLD QL IA.RAPID: ABNORMAL
HSV1+2 IGG SER IA-ACNC: 0.68 IV
HSV1+2 IGM SER IA-ACNC: 0.32 IV
IMM GRANULOCYTES # BLD: 0 K/UL
LYMPHOCYTES # BLD: 2.3 K/UL (ref 1.1–4.5)
LYMPHOCYTES NFR BLD: 21.9 % (ref 20–40)
MCH RBC QN AUTO: 28.3 PG (ref 27–31)
MCHC RBC AUTO-ENTMCNC: 32.9 G/DL (ref 33–37)
MCV RBC AUTO: 85.9 FL (ref 81–99)
MONOCYTES # BLD: 0.6 K/UL (ref 0–0.9)
MONOCYTES NFR BLD: 5.7 % (ref 0–10)
NEUTROPHILS # BLD: 7.4 K/UL (ref 1.5–7.5)
NEUTS SEG NFR BLD: 70.4 % (ref 50–65)
PLATELET # BLD AUTO: 359 K/UL (ref 130–400)
PMV BLD AUTO: 10.1 FL (ref 9.4–12.3)
RBC # BLD AUTO: 4.53 M/UL (ref 4.2–5.4)
RPR SER QL: ABNORMAL
RUBV IGG SER-ACNC: REACTIVE [IU]/ML
WBC # BLD AUTO: 10.6 K/UL (ref 4.8–10.8)

## 2024-02-14 LAB — VZV IGG SER QL IA: 2.16

## 2024-02-16 RX ORDER — ONDANSETRON 4 MG/1
4 TABLET, ORALLY DISINTEGRATING ORAL 3 TIMES DAILY PRN
Qty: 21 TABLET | Refills: 0 | Status: SHIPPED | OUTPATIENT
Start: 2024-02-16

## 2024-02-16 RX ORDER — PROGESTERONE 200 MG/1
200 CAPSULE ORAL NIGHTLY
Qty: 30 CAPSULE | Refills: 0 | Status: SHIPPED | OUTPATIENT
Start: 2024-02-16

## 2024-03-03 SDOH — ECONOMIC STABILITY: TRANSPORTATION INSECURITY
IN THE PAST 12 MONTHS, HAS LACK OF TRANSPORTATION KEPT YOU FROM MEETINGS, WORK, OR FROM GETTING THINGS NEEDED FOR DAILY LIVING?: NO

## 2024-03-03 SDOH — ECONOMIC STABILITY: FOOD INSECURITY: WITHIN THE PAST 12 MONTHS, THE FOOD YOU BOUGHT JUST DIDN'T LAST AND YOU DIDN'T HAVE MONEY TO GET MORE.: NEVER TRUE

## 2024-03-03 SDOH — ECONOMIC STABILITY: HOUSING INSECURITY
IN THE LAST 12 MONTHS, WAS THERE A TIME WHEN YOU DID NOT HAVE A STEADY PLACE TO SLEEP OR SLEPT IN A SHELTER (INCLUDING NOW)?: NO

## 2024-03-03 SDOH — ECONOMIC STABILITY: FOOD INSECURITY: WITHIN THE PAST 12 MONTHS, YOU WORRIED THAT YOUR FOOD WOULD RUN OUT BEFORE YOU GOT MONEY TO BUY MORE.: NEVER TRUE

## 2024-03-03 SDOH — ECONOMIC STABILITY: INCOME INSECURITY: HOW HARD IS IT FOR YOU TO PAY FOR THE VERY BASICS LIKE FOOD, HOUSING, MEDICAL CARE, AND HEATING?: NOT VERY HARD

## 2024-03-06 ENCOUNTER — ROUTINE PRENATAL (OUTPATIENT)
Dept: OBGYN CLINIC | Age: 29
End: 2024-03-06

## 2024-03-06 VITALS
WEIGHT: 263 LBS | DIASTOLIC BLOOD PRESSURE: 87 MMHG | SYSTOLIC BLOOD PRESSURE: 125 MMHG | HEART RATE: 103 BPM | BODY MASS INDEX: 41.28 KG/M2

## 2024-03-06 DIAGNOSIS — Z3A.13 13 WEEKS GESTATION OF PREGNANCY: Primary | ICD-10-CM

## 2024-03-06 DIAGNOSIS — O09.299 HISTORY OF MISCARRIAGE, CURRENTLY PREGNANT: ICD-10-CM

## 2024-03-06 DIAGNOSIS — Z34.81 ENCOUNTER FOR SUPERVISION OF OTHER NORMAL PREGNANCY, FIRST TRIMESTER: ICD-10-CM

## 2024-03-06 PROCEDURE — 0502F SUBSEQUENT PRENATAL CARE: CPT | Performed by: NURSE PRACTITIONER

## 2024-03-06 NOTE — PROGRESS NOTES
Pt presents today for routine prenatal visit. Pt denies vaginal bleeding, cramping, or leaking of fluid +fetal movement. She is wanting to have genetic testing done.

## 2024-03-06 NOTE — PATIENT INSTRUCTIONS
and drinks, but too much sugar can lead to cavities.  If you have reflux or morning sickness, rinse out your mouth. Try rinsing with a mixture of one teaspoon of baking soda in a cup of water. Wait at least 20 minutes before brushing. You might also ask your doctor if you can take over-the-counter medicine for reflux. Be safe with medicines. Read and follow all instructions on the label.  Do not smoke when you are pregnant or allow others to smoke around you. If you need help quitting, talk to your doctor about stop-smoking programs and medicines. These can increase your chances of quitting for good.  Regular visits to your dentist during pregnancy are important to prevent problems.  Tell your dentist that you are pregnant.  Dental X-rays and local anesthesia are generally safe during pregnancy.  Most dental work can be done while you are pregnant. If you go to the dentist during the second or third trimesters, you may be more comfortable sitting in a different position in the dental chair.  Delaying dental care can make a problem worse.  Follow-up care is a key part of your treatment and safety. Be sure to make and go to all appointments, and call your doctor if you are having problems. It's also a good idea to know your test results and keep a list of the medicines you take.  Where can you learn more?  Go to https://www.[employer].net/patientEd and enter G168 to learn more about \"Learning About Dental Care During Pregnancy.\"  Current as of: August 6, 2023               Content Version: 13.9  © 7410-0341 [employer].   Care instructions adapted under license by [employer]. If you have questions about a medical condition or this instruction, always ask your healthcare professional. [employer] disclaims any warranty or liability for your use of this information.

## 2024-03-06 NOTE — PROGRESS NOTES
S:Kira Hawk is here for a return obstetrical visit. Today she is 13w0d weeks EGA. She is doing well and has no complaints.  Doing unity testing today. Is aware of when can stop her prometrium. She  does not have vaginal bleeding, leaking of fluid, contractions. She does not have blurred vision, SOB, or increased swelling in legs or face. Pt does not feel fetal movement regularly.        O:   Vitals:    03/06/24 1424   BP: 125/87   Pulse: (!) 103   Weight: 119.3 kg (263 lb)     Pt is A&Ox3, in no acute distress. Normocephalic, atraumatic. PERRL. Resp even and non-labored. Skin pink, warm & dry. Gravid abdomen. WOODWARD's well. Gait steady.   See OB flowsheet.    A: Normal IUP at 13w0d wks      Diagnosis Orders   1. 13 weeks gestation of pregnancy        2. History of miscarriage, currently pregnant        3. Encounter for supervision of other normal pregnancy, first trimester            P:   Pt counseled on  sab precautions and Genetic testing  Continue with routine prenatal care.  RTC in 4 wk for prenatal visit    MEDICATIONS:  No orders of the defined types were placed in this encounter.      ORDERS:  No orders of the defined types were placed in this encounter.

## 2024-04-01 ENCOUNTER — ROUTINE PRENATAL (OUTPATIENT)
Dept: OBGYN CLINIC | Age: 29
End: 2024-04-01

## 2024-04-01 VITALS
BODY MASS INDEX: 41.59 KG/M2 | HEART RATE: 105 BPM | WEIGHT: 265 LBS | DIASTOLIC BLOOD PRESSURE: 83 MMHG | SYSTOLIC BLOOD PRESSURE: 132 MMHG

## 2024-04-01 DIAGNOSIS — Z34.82 ENCOUNTER FOR SUPERVISION OF OTHER NORMAL PREGNANCY, SECOND TRIMESTER: Primary | ICD-10-CM

## 2024-04-01 DIAGNOSIS — Z3A.16 16 WEEKS GESTATION OF PREGNANCY: ICD-10-CM

## 2024-04-01 DIAGNOSIS — O09.299 HISTORY OF MISCARRIAGE, CURRENTLY PREGNANT: ICD-10-CM

## 2024-04-01 PROCEDURE — 0502F SUBSEQUENT PRENATAL CARE: CPT | Performed by: ADVANCED PRACTICE MIDWIFE

## 2024-04-01 RX ORDER — ONDANSETRON 4 MG/1
4 TABLET, ORALLY DISINTEGRATING ORAL 3 TIMES DAILY PRN
Qty: 21 TABLET | Refills: 0 | Status: SHIPPED | OUTPATIENT
Start: 2024-04-01

## 2024-04-01 NOTE — PROGRESS NOTES
Pt presents today for routine prenatal visit. Pt denies vaginal bleeding, cramping, or leaking of fluid +fetal movement.

## 2024-04-01 NOTE — PATIENT INSTRUCTIONS
Patient Education        Weeks 14 to 18 of Your Pregnancy: Care Instructions  Around this time, you may start to look pregnant. Your baby is now able to pass urine. And the first stool (meconium) is starting to collect in your baby's intestines. Hair is starting to grow on your baby's head.    You may notice some skin changes, such as itchy spots on your palms or acne on your face.    At your next doctor visit, you may have an ultrasound. So you might think about whether you want to know the sex of your baby. Also ask your doctor about flu and COVID-19 shots.     How to reduce stress   Ask for help when you need it.  Try to avoid things that cause you stress.  Seek out things that relieve stress, such as breathing exercises or yoga.     How to get exercise   If you don't usually exercise, start slowly. Short walks may be a good choice.  Try to be active 30 minutes a day, at least 5 days a week.  Avoid activities where you're more likely to fall.  Use light weights to reduce stress on your joints.     How to stay at a healthy weight for you   Talk to your doctor or midwife about how much weight you should gain.  It's generally best to gain:  About 28 to 40 pounds if you're underweight.  About 25 to 35 pounds if you're at a healthy weight.  About 15 to 25 pounds if you're overweight.  About 11 to 20 pounds if you're very overweight (obese).  Follow-up care is a key part of your treatment and safety. Be sure to make and go to all appointments, and call your doctor if you are having problems. It's also a good idea to know your test results and keep a list of the medicines you take.  Where can you learn more?  Go to https://www.[employer].net/patientEd and enter I453 to learn more about \"Weeks 14 to 18 of Your Pregnancy: Care Instructions.\"  Current as of: July 10, 2023               Content Version: 14.0  © 4145-1679 [employer].   Care instructions adapted under license by [employer] (app). If you have

## 2024-04-01 NOTE — PROGRESS NOTES
[facility] Prenatal Office Note  Subjective:  Kira Hawk is here for a return obstetrical visit. Today she is 16w5d weeks EGA. She is doing well, taking her PNV as directed, and has no complaints.  She  does not have vaginal bleeding, n/v, syncope, or headaches.  Pt does feel fetal movement regularly.    Objective:  Mother's Prenatal Vitals  BP: 132/83  Weight - Scale: 120.2 kg (265 lb)  Pulse: (!) 105  Patient Position: Sitting  Prenatal Fetal Information  Fetal HR: 159  Movement: Increased  Pt is A&Ox3, in no acute distress. Normocephalic, atraumatic. PERRL. Resp even and non-labored. Skin pink, warm & dry. Gravid abdomen. WOODWARD's well. Gait steady.   Assessment:    IUP at 16w5d wks      Diagnosis Orders   1. Encounter for supervision of other normal pregnancy, second trimester        2. History of miscarriage, currently pregnant        3. 16 weeks gestation of pregnancy          Plan:  Pt counseled on balanced nutrition, adequate fluid intake, taking PNV daily, and exercise along with Genetic testing  Continue with routine prenatal care.  Return in about 4 weeks (around 4/29/2024), or if symptoms worsen or fail to improve, for prenatal.       MEDICATIONS:  No orders of the defined types were placed in this encounter.    ORDERS:  No orders of the defined types were placed in this encounter.      More than 50% of this 20 min visit was education and counseling.    SELMA Shell, am scribing for and in the presence of MARIA M Angeles.  I have seen and examined the patient independently.  I reviewed all laboratory and imaging studies that are relevant.  I have reviewed and made any appropriate changes to the HPI.    Electronically signed by CASEY Angeles CNM on 4/1/24  at 7:49 PM CDT   4/1/24  3:04 PM CDT

## 2024-04-29 RX ORDER — ONDANSETRON 4 MG/1
4 TABLET, ORALLY DISINTEGRATING ORAL 3 TIMES DAILY PRN
Qty: 21 TABLET | Refills: 0 | Status: SHIPPED | OUTPATIENT
Start: 2024-04-29

## 2024-05-01 ENCOUNTER — TELEPHONE (OUTPATIENT)
Dept: OBGYN CLINIC | Age: 29
End: 2024-05-01

## 2024-05-06 ENCOUNTER — ROUTINE PRENATAL (OUTPATIENT)
Dept: OBGYN CLINIC | Age: 29
End: 2024-05-06

## 2024-05-06 VITALS
HEART RATE: 102 BPM | DIASTOLIC BLOOD PRESSURE: 83 MMHG | SYSTOLIC BLOOD PRESSURE: 133 MMHG | WEIGHT: 271 LBS | BODY MASS INDEX: 42.53 KG/M2

## 2024-05-06 DIAGNOSIS — Z34.82 ENCOUNTER FOR SUPERVISION OF OTHER NORMAL PREGNANCY, SECOND TRIMESTER: ICD-10-CM

## 2024-05-06 DIAGNOSIS — Z3A.21 21 WEEKS GESTATION OF PREGNANCY: Primary | ICD-10-CM

## 2024-05-06 DIAGNOSIS — O09.299 HISTORY OF MISCARRIAGE, CURRENTLY PREGNANT: ICD-10-CM

## 2024-05-06 PROCEDURE — 0502F SUBSEQUENT PRENATAL CARE: CPT | Performed by: ADVANCED PRACTICE MIDWIFE

## 2024-05-06 NOTE — PROGRESS NOTES
[facility] Prenatal Office Note  Subjective:  Kira Hawk is here for a return obstetrical visit. Today she is 21w5d weeks EGA.   She is taking her prenatal vitamins and is aware of nutrition needs. She reports the following:    Problems/complaints today:  None  Objective:  Mother's Prenatal Vitals  BP: 133/83  Weight - Scale: 122.9 kg (271 lb)  Pulse: (!) 102  Patient Position: Sitting  Prenatal Fetal Information  Fetal HR: 144  Movement: Present  Pt is A&Ox3, in no acute distress. Normocephalic, atraumatic. PERRL. Resp even and non-labored. Skin pink, warm & dry. Gravid abdomen. WOODWARD's well. Gait steady.   Assessment:    IUP at 21w5d wks      Diagnosis Orders   1. 21 weeks gestation of pregnancy        2. Encounter for supervision of other normal pregnancy, second trimester        3. History of miscarriage, currently pregnant          Plan:  Problems/complaints Management Plan:  None  Routine OB Management Plan:  Pt counseled on balanced nutrition, adequate fluid intake, taking PNV daily, and exercise along with  Anatomy reviewed  Continue with routine prenatal care.  RTC in 4 wks for prenatal visit.      MEDICATIONS:  No orders of the defined types were placed in this encounter.    ORDERS:  No orders of the defined types were placed in this encounter.      More than 50% of this 20 min visit was education and counseling.      I Janice Allen LPN, am scribing for and in the presence of Rafia Huitron CNM  5/6/24  8:40 AM CDT .  I have seen and examined the patient independently.  I reviewed all laboratory and imaging studies that are relevant.  I have reviewed and made any appropriate changes to the HPI.    Electronically signed by CASEY Angeles CNM on 5/6/24  at 9:37 AM CDT

## 2024-05-06 NOTE — PATIENT INSTRUCTIONS
a medical condition or this instruction, always ask your healthcare professional. [employer] disclaims any warranty or liability for your use of this information.

## 2024-05-07 ENCOUNTER — TELEPHONE (OUTPATIENT)
Dept: OBGYN CLINIC | Age: 29
End: 2024-05-07

## 2024-05-23 RX ORDER — ONDANSETRON 4 MG/1
4 TABLET, ORALLY DISINTEGRATING ORAL 3 TIMES DAILY PRN
Qty: 21 TABLET | Refills: 0 | Status: SHIPPED | OUTPATIENT
Start: 2024-05-23

## 2024-06-06 ENCOUNTER — ROUTINE PRENATAL (OUTPATIENT)
Dept: OBGYN CLINIC | Age: 29
End: 2024-06-06

## 2024-06-06 VITALS
HEART RATE: 103 BPM | SYSTOLIC BLOOD PRESSURE: 125 MMHG | DIASTOLIC BLOOD PRESSURE: 83 MMHG | BODY MASS INDEX: 43.79 KG/M2 | WEIGHT: 279 LBS

## 2024-06-06 DIAGNOSIS — Z34.82 ENCOUNTER FOR SUPERVISION OF OTHER NORMAL PREGNANCY, SECOND TRIMESTER: ICD-10-CM

## 2024-06-06 DIAGNOSIS — Z3A.26 26 WEEKS GESTATION OF PREGNANCY: Primary | ICD-10-CM

## 2024-06-06 DIAGNOSIS — O09.299 HISTORY OF MISCARRIAGE, CURRENTLY PREGNANT: ICD-10-CM

## 2024-06-06 PROCEDURE — 0502F SUBSEQUENT PRENATAL CARE: CPT | Performed by: ADVANCED PRACTICE MIDWIFE

## 2024-06-06 NOTE — PROGRESS NOTES
Pt presents today for routine prenatal visit. Pt denies vaginal bleeding, cramping, or leaking of fluid +fetal movement.     Pt did not do glucose today due to working over night last night.   
reviewed and made any appropriate changes to the HPI.    Electronically signed by CASEY Angeles CNM on 6/6/24  at 11:04 AM IRVIN

## 2024-06-17 DIAGNOSIS — Z3A.27 27 WEEKS GESTATION OF PREGNANCY: Primary | ICD-10-CM

## 2024-06-17 DIAGNOSIS — Z3A.27 27 WEEKS GESTATION OF PREGNANCY: ICD-10-CM

## 2024-06-20 ENCOUNTER — ROUTINE PRENATAL (OUTPATIENT)
Dept: OBGYN CLINIC | Age: 29
End: 2024-06-20

## 2024-06-20 VITALS
SYSTOLIC BLOOD PRESSURE: 131 MMHG | DIASTOLIC BLOOD PRESSURE: 82 MMHG | BODY MASS INDEX: 43.95 KG/M2 | WEIGHT: 280 LBS | HEART RATE: 106 BPM

## 2024-06-20 DIAGNOSIS — O09.299 HISTORY OF MISCARRIAGE, CURRENTLY PREGNANT: ICD-10-CM

## 2024-06-20 DIAGNOSIS — Z3A.28 28 WEEKS GESTATION OF PREGNANCY: Primary | ICD-10-CM

## 2024-06-20 DIAGNOSIS — Z34.82 ENCOUNTER FOR SUPERVISION OF OTHER NORMAL PREGNANCY, SECOND TRIMESTER: ICD-10-CM

## 2024-06-20 DIAGNOSIS — Z13.32 ENCOUNTER FOR SCREENING FOR MATERNAL DEPRESSION: ICD-10-CM

## 2024-06-20 PROCEDURE — 0502F SUBSEQUENT PRENATAL CARE: CPT | Performed by: ADVANCED PRACTICE MIDWIFE

## 2024-06-20 PROCEDURE — S3005 EVAL SELF-ASSESS DEPRESSION: HCPCS | Performed by: ADVANCED PRACTICE MIDWIFE

## 2024-06-20 NOTE — PROGRESS NOTES
CNM Prenatal Office Note  Subjective:  Kira Hawk is here for a return obstetrical visit. Today she is 28w1d weeks EGA.  Pt does feel fetal movement regularly. Denies headaches, RUQ pain, or visual changes as well as contractions, vaginal bleeding or leaking of fluid. 1 hour GCT today. Rhogam Not indicated.   Problems/Complaints today:  Forgot about GCT today and ate sugar breakfast  Objective:  Mother's Prenatal Vitals  BP: 131/82  Weight - Scale: 127 kg (280 lb)  Pulse: (!) 106  Patient Position: Sitting  Prenatal Fetal Information  Fundal Height (cm): 29 cm  Fetal HR: US  Movement: Present  Pt is A&Ox3, in no acute distress. Normocephalic, atraumatic. PERRL. Resp even and non-labored. Skin pink, warm & dry. Gravid abdomen. WOODWARD's well. Gait steady. EPDS Screening: 3/30  Assessment:    IUP at 28w1d wks      Diagnosis Orders   1. 28 weeks gestation of pregnancy  AK EVAL SELF-ASSESS DEPRESSION      2. Encounter for screening for maternal depression  AK EVAL SELF-ASSESS DEPRESSION      3. History of miscarriage, currently pregnant        4. Encounter for supervision of other normal pregnancy, second trimester  AK EVAL SELF-ASSESS DEPRESSION        Plan:  Problems/Complaints Management Plan:  none  Routine OB Management Plan:  Third trimester teaching completed including warning signs for pre-eclampsia (blurred vision, seeing spots/sparkles, sudden increased weight gain or profound edema, epigastric pain), FKC (decreased fetal movments),  labor ( contractions or watery discharge, vaginal bleeding, cramping), n/v, chills, and or fever. Instructed pt to come to office or go to [facility] if these symptoms presented. Pt voiced understanding.   Pt counseled on balanced nutrition, adequate fluid intake, taking PNV daily, and exercise along with  GCT next visit  Continue with routine prenatal care.  RTC in 2 wks for prenatal visit      MEDICATIONS:  No orders of the defined types were placed in this

## 2024-06-20 NOTE — PATIENT INSTRUCTIONS
(VICP) is a federal program that was created to compensate people who may have been injured by certain vaccines. Claims regarding alleged injury or death due to vaccination have a time limit for filing, which may be as short as two years. Visit the VICP website at www.[facility].gov/vaccinecompensation or call 1-795.453.7083 to learn about the program and about filing a claim.  How can I learn more?  Ask your health care provider.  Call your local or state health department.  Visit the website of the Food and Drug Administration (FDA) for vaccine package inserts and additional information at www.fda.gov/vaccines-blood-biologics/vaccines.  Contact the Centers for Disease Control and Prevention (CDC):  Call 1-552.494.6267 (8-006-TXD-INFO) or  Visit CDC's website at www.cdc.gov/vaccines.  Vaccine Information Statement  Tdap (Tetanus, Diphtheria, Pertussis) Vaccine  8/6/2021  42 U.S.C. § 300aa-26  Department of Health and Human Services  Centers for Disease Control and Prevention  Many vaccine information statements are available in [place] and other languages. See www.immunize.org/vis  Hojas de información sobre vacunas están disponibles en español y en muchos otros idiomas. Visite www.immunize.org/vis  Care instructions adapted under license by [facility]. If you have questions about a medical condition or this instruction, always ask your healthcare professional. [employer] disclaims any warranty or liability for your use of this information.

## 2024-06-20 NOTE — PROGRESS NOTES
Pt presents today for routine prenatal visit. Pt denies vaginal bleeding, cramping, or leaking of fluid +fetal movement.     Pt stated she forgot her drink for glucose and had a sugary breakfast so she is going to do it another day soon.

## 2024-06-24 RX ORDER — ONDANSETRON 4 MG/1
4 TABLET, ORALLY DISINTEGRATING ORAL 3 TIMES DAILY PRN
Qty: 21 TABLET | Refills: 0 | Status: SHIPPED | OUTPATIENT
Start: 2024-06-24

## 2024-06-27 DIAGNOSIS — O09.299 HISTORY OF MISCARRIAGE, CURRENTLY PREGNANT: ICD-10-CM

## 2024-06-27 DIAGNOSIS — Z3A.26 26 WEEKS GESTATION OF PREGNANCY: ICD-10-CM

## 2024-06-27 DIAGNOSIS — Z34.82 ENCOUNTER FOR SUPERVISION OF OTHER NORMAL PREGNANCY, SECOND TRIMESTER: ICD-10-CM

## 2024-06-27 LAB
BASOPHILS # BLD: 0.1 K/UL (ref 0–0.2)
BASOPHILS NFR BLD: 0.4 % (ref 0–1)
EOSINOPHIL # BLD: 0.1 K/UL (ref 0–0.6)
EOSINOPHIL NFR BLD: 1 % (ref 0–5)
ERYTHROCYTE [DISTWIDTH] IN BLOOD BY AUTOMATED COUNT: 13 % (ref 11.5–14.5)
GLUCOSE 1H P MEAL SERPL-MCNC: 66 MG/DL (ref 75–140)
HCT VFR BLD AUTO: 35.4 % (ref 37–47)
HGB BLD-MCNC: 11.4 G/DL (ref 12–16)
IMM GRANULOCYTES # BLD: 0 K/UL
LYMPHOCYTES # BLD: 2.2 K/UL (ref 1.1–4.5)
LYMPHOCYTES NFR BLD: 19.2 % (ref 20–40)
MCH RBC QN AUTO: 28.9 PG (ref 27–31)
MCHC RBC AUTO-ENTMCNC: 32.2 G/DL (ref 33–37)
MCV RBC AUTO: 89.6 FL (ref 81–99)
MONOCYTES # BLD: 0.7 K/UL (ref 0–0.9)
MONOCYTES NFR BLD: 5.9 % (ref 0–10)
NEUTROPHILS # BLD: 8.5 K/UL (ref 1.5–7.5)
NEUTS SEG NFR BLD: 73.2 % (ref 50–65)
PLATELET # BLD AUTO: 292 K/UL (ref 130–400)
PMV BLD AUTO: 9.9 FL (ref 9.4–12.3)
RBC # BLD AUTO: 3.95 M/UL (ref 4.2–5.4)
RPR SER QL: NORMAL
WBC # BLD AUTO: 11.6 K/UL (ref 4.8–10.8)

## 2024-07-10 ENCOUNTER — ROUTINE PRENATAL (OUTPATIENT)
Dept: OBGYN CLINIC | Age: 29
End: 2024-07-10

## 2024-07-10 VITALS
SYSTOLIC BLOOD PRESSURE: 132 MMHG | HEART RATE: 108 BPM | WEIGHT: 275 LBS | DIASTOLIC BLOOD PRESSURE: 84 MMHG | BODY MASS INDEX: 43.16 KG/M2

## 2024-07-10 DIAGNOSIS — Z3A.31 31 WEEKS GESTATION OF PREGNANCY: Primary | ICD-10-CM

## 2024-07-10 DIAGNOSIS — Z34.83 ENCOUNTER FOR SUPERVISION OF OTHER NORMAL PREGNANCY, THIRD TRIMESTER: ICD-10-CM

## 2024-07-10 DIAGNOSIS — O09.299 HISTORY OF MISCARRIAGE, CURRENTLY PREGNANT: ICD-10-CM

## 2024-07-10 PROCEDURE — 0502F SUBSEQUENT PRENATAL CARE: CPT | Performed by: ADVANCED PRACTICE MIDWIFE

## 2024-07-10 NOTE — PATIENT INSTRUCTIONS
seats, call the National Highway Traffic Safety Administration at 1-216.804.1674.  Baby care items  Start stocking up on disposable diapers (unless you plan to use cloth diapers) and wipes. If you want, you can buy a few packages of  diapers, which come with a cutout in the front so the diaper does not touch the baby's umbilical cord stump. You also can buy regular infant diapers and roll down the front.  You may get some baby clothes from family and friends at baby showers and after the baby arrives. Ask for (or buy) a few basics to get you started. Get several sleep sacks or nightgowns, T-shirts that snap at the crotch (called \"onesies\"), small blankets to wrap the baby in (called receiving blankets), and one-piece outfits that snap or zip down one leg. This is so that you do not have to take off all the baby's clothes to change a diaper. Socks or booties are also a good idea to keep your baby's feet warm. Get a cotton cap or two. Newborns also need their heads covered to stay warm.  Put together a kit of baby care items. Include diaper rash cream, baby nail clippers, a nasal bulb syringe (to help clear a stuffy nose), and baby wash, which also can be used as shampoo.  If you plan to breastfeed, buy a couple of nursing bras. You can wear one while the other one is in the wash. Also, you may want get a nipple cream that contains lanolin. Some women also like to put nursing pads in their bras to prevent breast milk from leaking onto their clothes.  If you plan to bottle-feed, buy several cans of formula and several bottles to get you started.  Where can you learn more?  Go to https://www.[facility].net/patientEd and enter T918 to learn more about \"Getting Ready for Baby: Care Instructions.\"  Current as of: July 10, 2023               Content Version: 14.0  © 6435-9194 [employer].   Care instructions adapted under license by [facility]. If you have questions about a medical condition or this

## 2024-07-10 NOTE — PROGRESS NOTES
[facility] Prenatal Office Note  Subjective:  Kira Hawk is here for a return obstetrical visit. Today she is 31w0d weeks EGA.  Pt does feel fetal movement regularly. Denies headaches, RUQ pain, or visual changes as well as contractions, vaginal bleeding or leaking of fluid.     Problems/complaints today:  None  Objective:  Mother's Prenatal Vitals  BP: 132/84  Weight - Scale: 124.7 kg (275 lb)  Pulse: (!) 108  Patient Position: Sitting  Prenatal Fetal Information  Fetal HR: TPG- 154  Movement: Present  Pt is A&Ox3, in no acute distress. Normocephalic, atraumatic. PERRL. Resp even and non-labored. Skin pink, warm & dry. Gravid abdomen. WOODWARD's well. Gait steady.   Assessment:    IUP at 31w0d wks      Diagnosis Orders   1. 31 weeks gestation of pregnancy        2. History of miscarriage, currently pregnant        3. Encounter for supervision of other normal pregnancy, third trimester          Plan:  Problems/Complaints Management Plan:  None  Routine OB Management Plan:  Pt counseled on balanced nutrition, adequate fluid intake, taking PNV daily, and exercise along with GHTN precautions, Kick count, and  labor  Continue with routine prenatal care.   surveillance indicated for EFW 10%   RTC in 1 wks for prenatal visit    MEDICATIONS:  No orders of the defined types were placed in this encounter.    ORDERS:  No orders of the defined types were placed in this encounter.      More than 50% of this 20 min visit was education and counseling.      I Janice Allen LPN, am scribing for and in the presence of Rafia Huitron CNM  7/10/24  3:34 PM CDT   I have seen and examined the patient independently.  I reviewed all laboratory and imaging studies that are relevant.  I have reviewed and made any appropriate changes to the HPI.    Electronically signed by CASEY Angeles CNM on 7/10/24  at 5:32 PM CDT

## 2024-07-11 DIAGNOSIS — Z3A.31 31 WEEKS GESTATION OF PREGNANCY: Primary | ICD-10-CM

## 2024-07-11 DIAGNOSIS — Z3A.31 31 WEEKS GESTATION OF PREGNANCY: ICD-10-CM

## 2024-07-17 ENCOUNTER — TELEPHONE (OUTPATIENT)
Dept: OBGYN CLINIC | Age: 29
End: 2024-07-17

## 2024-07-18 RX ORDER — ONDANSETRON 4 MG/1
4 TABLET, ORALLY DISINTEGRATING ORAL 3 TIMES DAILY PRN
Qty: 21 TABLET | Refills: 0 | Status: SHIPPED | OUTPATIENT
Start: 2024-07-18

## 2024-07-19 ENCOUNTER — ROUTINE PRENATAL (OUTPATIENT)
Dept: OBGYN CLINIC | Age: 29
End: 2024-07-19

## 2024-07-19 VITALS
HEART RATE: 99 BPM | BODY MASS INDEX: 43.63 KG/M2 | WEIGHT: 278 LBS | SYSTOLIC BLOOD PRESSURE: 119 MMHG | DIASTOLIC BLOOD PRESSURE: 75 MMHG

## 2024-07-19 DIAGNOSIS — Z3A.32 32 WEEKS GESTATION OF PREGNANCY: Primary | ICD-10-CM

## 2024-07-19 DIAGNOSIS — Z34.83 ENCOUNTER FOR SUPERVISION OF OTHER NORMAL PREGNANCY, THIRD TRIMESTER: ICD-10-CM

## 2024-07-19 DIAGNOSIS — O09.299 HISTORY OF MISCARRIAGE, CURRENTLY PREGNANT: ICD-10-CM

## 2024-07-19 PROCEDURE — 0502F SUBSEQUENT PRENATAL CARE: CPT

## 2024-07-19 NOTE — PROGRESS NOTES
CNM Prenatal Office Note  Subjective:  Kira Hawk is here for a return obstetrical visit. Today she is 32w2d weeks EGA.  Pt does feel fetal movement regularly. Denies headaches, RUQ pain, or visual changes as well as contractions, vaginal bleeding or leaking of fluid.     Problems/complaints today:  none  Objective:  Mother's Prenatal Vitals  BP: 119/75  Weight - Scale: 126.1 kg (278 lb)  Pulse: 99  Patient Position: Sitting  Prenatal Fetal Information  Fetal HR: 137  Movement: Present  Pt is A&Ox3, in no acute distress. Normocephalic, atraumatic. PERRL. Resp even and non-labored. Skin pink, warm & dry. Gravid abdomen. WOODWARD's well. Gait steady.   Assessment:    IUP at 32w2d wks      Diagnosis Orders   1. 32 weeks gestation of pregnancy        2. History of miscarriage, currently pregnant        3. Encounter for supervision of other normal pregnancy, third trimester          Plan:  Routine OB Management Plan:  Pt counseled on balanced nutrition, adequate fluid intake, taking PNV daily, and exercise along with GHTN precautions, Kick count, and  labor  Continue with routine prenatal care.   surveillance indicated for IUGR  RTC in 1 wks for prenatal visit    MEDICATIONS:  No orders of the defined types were placed in this encounter.    ORDERS:  No orders of the defined types were placed in this encounter.      More than 50% of this 20 min visit was education and counseling.           Strong peripheral pulses

## 2024-07-22 DIAGNOSIS — Z3A.32 32 WEEKS GESTATION OF PREGNANCY: Primary | ICD-10-CM

## 2024-07-22 DIAGNOSIS — Z3A.32 32 WEEKS GESTATION OF PREGNANCY: ICD-10-CM

## 2024-07-23 ENCOUNTER — ROUTINE PRENATAL (OUTPATIENT)
Dept: OBGYN CLINIC | Age: 29
End: 2024-07-23

## 2024-07-23 VITALS
SYSTOLIC BLOOD PRESSURE: 136 MMHG | WEIGHT: 278 LBS | HEART RATE: 111 BPM | BODY MASS INDEX: 43.63 KG/M2 | DIASTOLIC BLOOD PRESSURE: 89 MMHG

## 2024-07-23 DIAGNOSIS — O09.299 HISTORY OF MISCARRIAGE, CURRENTLY PREGNANT: ICD-10-CM

## 2024-07-23 DIAGNOSIS — O36.5930 POOR FETAL GROWTH AFFECTING MANAGEMENT OF MOTHER IN THIRD TRIMESTER, SINGLE OR UNSPECIFIED FETUS: ICD-10-CM

## 2024-07-23 DIAGNOSIS — Z3A.32 32 WEEKS GESTATION OF PREGNANCY: Primary | ICD-10-CM

## 2024-07-23 PROCEDURE — 0502F SUBSEQUENT PRENATAL CARE: CPT | Performed by: ADVANCED PRACTICE MIDWIFE

## 2024-07-23 NOTE — PROGRESS NOTES
[facility] Prenatal Office Note  Subjective:  Kira Hawk is here for a return obstetrical visit. Today she is 32w6d weeks EGA.  Pt does feel fetal movement regularly. Denies headaches, RUQ pain, or visual changes as well as contractions, vaginal bleeding or leaking of fluid.     Problems/complaints today:  None  Objective:  Mother's Prenatal Vitals  BP: 136/89  Weight - Scale: 126.1 kg (278 lb)  Pulse: (!) 111  Patient Position: Sitting  Prenatal Fetal Information  Fundal Height (cm): 34 cm  Fetal HR: 134  Movement: Present  Pt is A&Ox3, in no acute distress. Normocephalic, atraumatic. PERRL. Resp even and non-labored. Skin pink, warm & dry. Gravid abdomen. WOODWARD's well. Gait steady.   Assessment:    IUP at 32w6d wks      Diagnosis Orders   1. 32 weeks gestation of pregnancy        2. History of miscarriage, currently pregnant        3. Poor fetal growth affecting management of mother in third trimester, single or unspecified fetus          Plan:  Problems/Complaints Management Plan:  None  Routine OB Management Plan:  Pt counseled on balanced nutrition, adequate fluid intake, taking PNV daily, and exercise along with GHTN precautions, Kick count, and  labor  Continue with routine prenatal care.   surveillance indicated for IUGR, BMI  RTC in 1 wks for prenatal visit    MEDICATIONS:  No orders of the defined types were placed in this encounter.    ORDERS:  No orders of the defined types were placed in this encounter.      More than 50% of this 20 min visit was education and counseling.        I Janice Allen LPN, am scribing for and in the presence of Rafia Huitron CNM  24  10:50 AM CDT   I have seen and examined the patient independently.  I reviewed all laboratory and imaging studies that are relevant.  I have reviewed and made any appropriate changes to the HPI.    Electronically signed by CASEY Angeles CNM on 24  at 12:59 PM CDT

## 2024-07-23 NOTE — PATIENT INSTRUCTIONS
learn what you can do to manage labor pain.  If you have other children, take a class to learn how to help them adjust to the new baby.  Develop a written birth plan, if you choose to, keeping in mind that labor is hard to predict and your plan may change after labor begins. Some of what a birth plan may address includes:  Where you would like to have your baby. This includes the building and the room. It could be the hospital's birthing room, a separate birthing center, or your own home.  Who you would like to assist with delivery of your baby. You may want your doctor, an obstetrician, or a certified nurse-midwife. Some women prefer a lay midwife or a  to provide support before and after delivery.  Whether you want a , nurse, midwife, or  to give you support from early labor until after childbirth.  What comfort measures you want. You may have to choose between walking around during labor or having the security of a heart monitor for your baby. You may want to listen to music during labor. You may know what position you want to be in (such as sitting, squatting, or reclining) for pushing.  What pain relief you would like. There are several choices, so be sure to talk with your doctor about them.  How you want you and your baby to spend the first few hours after birth.  You may want to keep your baby with you for at least 1 hour after birth for bonding and early breastfeeding.  You may want the hospital to delay some infant care steps, such as a vitamin K injection, so that you have calming time with your baby.  You may not want visitors, or you may want other family members there.  Know the early signs of labor, such as a steady ache low in your back.  If you are going to a hospital or clinic to have your baby, have your bag ready to go.  Pack a nightgown, robe, panties, socks, and slippers. You may want to bring your own soap, shampoo, brush, toothbrush, and toothpaste. Bring your own

## 2024-07-29 DIAGNOSIS — Z3A.33 33 WEEKS GESTATION OF PREGNANCY: ICD-10-CM

## 2024-07-29 DIAGNOSIS — Z3A.33 33 WEEKS GESTATION OF PREGNANCY: Primary | ICD-10-CM

## 2024-07-30 ENCOUNTER — ROUTINE PRENATAL (OUTPATIENT)
Dept: OBGYN CLINIC | Age: 29
End: 2024-07-30

## 2024-07-30 VITALS
SYSTOLIC BLOOD PRESSURE: 113 MMHG | HEART RATE: 101 BPM | DIASTOLIC BLOOD PRESSURE: 80 MMHG | BODY MASS INDEX: 44.26 KG/M2 | WEIGHT: 282 LBS

## 2024-07-30 DIAGNOSIS — O09.299 HISTORY OF MISCARRIAGE, CURRENTLY PREGNANT: Primary | ICD-10-CM

## 2024-07-30 DIAGNOSIS — Z34.83 ENCOUNTER FOR SUPERVISION OF OTHER NORMAL PREGNANCY, THIRD TRIMESTER: ICD-10-CM

## 2024-07-30 DIAGNOSIS — O36.5930 POOR FETAL GROWTH AFFECTING MANAGEMENT OF MOTHER IN THIRD TRIMESTER, SINGLE OR UNSPECIFIED FETUS: ICD-10-CM

## 2024-07-30 DIAGNOSIS — Z3A.33 33 WEEKS GESTATION OF PREGNANCY: ICD-10-CM

## 2024-07-30 PROCEDURE — 0502F SUBSEQUENT PRENATAL CARE: CPT | Performed by: ADVANCED PRACTICE MIDWIFE

## 2024-07-30 NOTE — PROGRESS NOTES
[facility] Prenatal Office Note  Subjective:  Kira Hawk is here for a return obstetrical visit. Today she is 33w6d weeks EGA.  Pt does feel fetal movement regularly. Denies headaches, RUQ pain, or visual changes as well as contractions, vaginal bleeding or leaking of fluid.     Problems/complaints today:  None  Objective:  Mother's Prenatal Vitals  BP: 113/80  Weight - Scale: 127.9 kg (282 lb)  Pulse: (!) 101  Patient Position: Sitting  Prenatal Fetal Information  Fundal Height (cm): 34 cm  Fetal HR: 147-tpg  Movement: Present  Pt is A&Ox3, in no acute distress. Normocephalic, atraumatic. PERRL. Resp even and non-labored. Skin pink, warm & dry. Gravid abdomen. WOODWARD's well. Gait steady.   Assessment:    IUP at 33w6d wks      Diagnosis Orders   1. History of miscarriage, currently pregnant        2. Poor fetal growth affecting management of mother in third trimester, single or unspecified fetus        3. Encounter for supervision of other normal pregnancy, third trimester        4. 33 weeks gestation of pregnancy          Plan:  Problems/Complaints Management Plan:  None  Routine OB Management Plan:  Pt counseled on balanced nutrition, adequate fluid intake, taking PNV daily, and exercise along with GHTN precautions, Kick count, and  labor  Continue with routine prenatal care.   surveillance indicated for IUGR  RTC in 1 wks for prenatal visit    MEDICATIONS:  No orders of the defined types were placed in this encounter.    ORDERS:  No orders of the defined types were placed in this encounter.      More than 50% of this 20 min visit was education and counseling.        I Janice Allen LPN, am scribing for and in the presence of Rafia Huitron CNM  24  10:09 AM CDT   I have seen and examined the patient independently.  I reviewed all laboratory and imaging studies that are relevant.  I have reviewed and made any appropriate changes to the HPI.    Electronically signed by CASEY Angeles -

## 2024-07-31 DIAGNOSIS — Z3A.33 33 WEEKS GESTATION OF PREGNANCY: Primary | ICD-10-CM

## 2024-07-31 DIAGNOSIS — Z3A.33 33 WEEKS GESTATION OF PREGNANCY: ICD-10-CM

## 2024-08-08 ENCOUNTER — ROUTINE PRENATAL (OUTPATIENT)
Dept: OBGYN CLINIC | Age: 29
End: 2024-08-08

## 2024-08-08 VITALS
HEART RATE: 98 BPM | SYSTOLIC BLOOD PRESSURE: 131 MMHG | WEIGHT: 286 LBS | DIASTOLIC BLOOD PRESSURE: 83 MMHG | BODY MASS INDEX: 44.89 KG/M2

## 2024-08-08 DIAGNOSIS — Z11.3 SCREEN FOR STD (SEXUALLY TRANSMITTED DISEASE): ICD-10-CM

## 2024-08-08 DIAGNOSIS — Z36.85 ANTENATAL SCREENING FOR STREPTOCOCCUS B: Primary | ICD-10-CM

## 2024-08-08 LAB
C TRACH DNA CVX QL NAA+PROBE: NOT DETECTED
N GONORRHOEA DNA CERV MUCUS QL NAA+PROBE: NOT DETECTED
T VAGINALIS DNA GENITAL QL NAA+PROBE: NOT DETECTED

## 2024-08-08 PROCEDURE — 0502F SUBSEQUENT PRENATAL CARE: CPT | Performed by: ADVANCED PRACTICE MIDWIFE

## 2024-08-08 NOTE — PROGRESS NOTES
CNM Prenatal Office Note  Subjective:  Kira Hawk is here for a return obstetrical visit. Today she is 35w1d weeks EGA.  Pt does feel fetal movement regularly. Denies headaches, RUQ pain, or visual changes as well as contractions, vaginal bleeding or leaking of fluid.     Problems/complaints today:  None  Objective:  Mother's Prenatal Vitals  BP: 131/83  Weight - Scale: 129.7 kg (286 lb)  Pulse: 98  Patient Position: Sitting  Prenatal Fetal Information  Fundal Height (cm): 35 cm  Fetal HR: 140-tpg  Movement: Present  Cervical Exam  Dilation (cm): 2  Effacement: 50  Station: -2  Station (Labor Curve Graph): 7  Dil/Eff/Sta  Dilation (cm): 2  Effacement: 50  Station: -2  Pt is A&Ox3, in no acute distress. Normocephalic, atraumatic. PERRL. Resp even and non-labored. Skin pink, warm & dry. Gravid abdomen. WOODWARD's well. Gait steady.   Assessment:    IUP at 35w1d wks      Diagnosis Orders   1.  screening for streptococcus B  Strep B DNA probe, amplification      2. Screen for STD (sexually transmitted disease)  Chlamydia, Gonorrhea, Trichomoniasis Swab        Plan:   Problems/Complatins Management Plan:  None  Routine OB Management Plan:  GBS collected and labor education completed. Encourage perineal massage. Discussed pain management options during labor and birth plan. Pt counseled on counseled on balanced nutrition, adequate fluid intake, taking PNV daily, and exercise along with GHTN precautions, Kick count, and  labor  Continue with routine prenatal care.   surveillance indicated for BMI  RTC in 1 wks for prenatal visit    MEDICATIONS:  No orders of the defined types were placed in this encounter.    ORDERS:  Orders Placed This Encounter   Procedures    Chlamydia, Gonorrhea, Trichomoniasis Swab    Strep B DNA probe, amplification       More than 50% of this 20 min visit was education and counseling.    ORDERS:  Orders Placed This Encounter   Procedures    Chlamydia, Gonorrhea,

## 2024-08-09 LAB — GP B STREP DNA SPEC QL NAA+PROBE: NOT DETECTED

## 2024-08-12 SDOH — ECONOMIC STABILITY: FOOD INSECURITY: WITHIN THE PAST 12 MONTHS, YOU WORRIED THAT YOUR FOOD WOULD RUN OUT BEFORE YOU GOT MONEY TO BUY MORE.: NEVER TRUE

## 2024-08-12 SDOH — ECONOMIC STABILITY: INCOME INSECURITY: HOW HARD IS IT FOR YOU TO PAY FOR THE VERY BASICS LIKE FOOD, HOUSING, MEDICAL CARE, AND HEATING?: NOT VERY HARD

## 2024-08-12 SDOH — ECONOMIC STABILITY: FOOD INSECURITY: WITHIN THE PAST 12 MONTHS, THE FOOD YOU BOUGHT JUST DIDN'T LAST AND YOU DIDN'T HAVE MONEY TO GET MORE.: NEVER TRUE

## 2024-08-15 ENCOUNTER — ROUTINE PRENATAL (OUTPATIENT)
Dept: OBGYN CLINIC | Age: 29
End: 2024-08-15

## 2024-08-15 VITALS
SYSTOLIC BLOOD PRESSURE: 127 MMHG | DIASTOLIC BLOOD PRESSURE: 88 MMHG | BODY MASS INDEX: 44.9 KG/M2 | HEART RATE: 118 BPM | WEIGHT: 286.1 LBS

## 2024-08-15 DIAGNOSIS — Z3A.36 36 WEEKS GESTATION OF PREGNANCY: ICD-10-CM

## 2024-08-15 DIAGNOSIS — Z3A.36 36 WEEKS GESTATION OF PREGNANCY: Primary | ICD-10-CM

## 2024-08-15 DIAGNOSIS — Z34.83 PRENATAL CARE, SUBSEQUENT PREGNANCY IN THIRD TRIMESTER: ICD-10-CM

## 2024-08-15 PROCEDURE — 0502F SUBSEQUENT PRENATAL CARE: CPT | Performed by: OBSTETRICS & GYNECOLOGY

## 2024-08-15 ASSESSMENT — PATIENT HEALTH QUESTIONNAIRE - PHQ9
SUM OF ALL RESPONSES TO PHQ QUESTIONS 1-9: 0
2. FEELING DOWN, DEPRESSED OR HOPELESS: NOT AT ALL
1. LITTLE INTEREST OR PLEASURE IN DOING THINGS: NOT AT ALL
SUM OF ALL RESPONSES TO PHQ9 QUESTIONS 1 & 2: 0

## 2024-08-15 NOTE — PROGRESS NOTES
Pt states she feels ok and c/o occ ctxs. Pt admits to +FM and denies ROM and vaginal bleeding. Pt had growth U/S today, and BPP was 8/8. PTL precautions/FKC reviewed with pt.

## 2024-08-17 SDOH — ECONOMIC STABILITY: FOOD INSECURITY: WITHIN THE PAST 12 MONTHS, YOU WORRIED THAT YOUR FOOD WOULD RUN OUT BEFORE YOU GOT MONEY TO BUY MORE.: NEVER TRUE

## 2024-08-17 SDOH — ECONOMIC STABILITY: INCOME INSECURITY: HOW HARD IS IT FOR YOU TO PAY FOR THE VERY BASICS LIKE FOOD, HOUSING, MEDICAL CARE, AND HEATING?: NOT VERY HARD

## 2024-08-17 SDOH — ECONOMIC STABILITY: FOOD INSECURITY: WITHIN THE PAST 12 MONTHS, THE FOOD YOU BOUGHT JUST DIDN'T LAST AND YOU DIDN'T HAVE MONEY TO GET MORE.: NEVER TRUE

## 2024-08-19 RX ORDER — ONDANSETRON 4 MG/1
4 TABLET, ORALLY DISINTEGRATING ORAL 3 TIMES DAILY PRN
Qty: 21 TABLET | Refills: 0 | Status: SHIPPED | OUTPATIENT
Start: 2024-08-19

## 2024-08-20 ENCOUNTER — ROUTINE PRENATAL (OUTPATIENT)
Dept: OBGYN CLINIC | Age: 29
End: 2024-08-20

## 2024-08-20 VITALS
BODY MASS INDEX: 44.26 KG/M2 | HEART RATE: 139 BPM | WEIGHT: 282 LBS | DIASTOLIC BLOOD PRESSURE: 86 MMHG | SYSTOLIC BLOOD PRESSURE: 138 MMHG

## 2024-08-20 DIAGNOSIS — Z34.83 ENCOUNTER FOR SUPERVISION OF OTHER NORMAL PREGNANCY, THIRD TRIMESTER: ICD-10-CM

## 2024-08-20 DIAGNOSIS — Z3A.37 37 WEEKS GESTATION OF PREGNANCY: Primary | ICD-10-CM

## 2024-08-20 DIAGNOSIS — O36.5930 POOR FETAL GROWTH AFFECTING MANAGEMENT OF MOTHER IN THIRD TRIMESTER, SINGLE OR UNSPECIFIED FETUS: ICD-10-CM

## 2024-08-20 DIAGNOSIS — O09.299 HISTORY OF MISCARRIAGE, CURRENTLY PREGNANT: ICD-10-CM

## 2024-08-20 PROCEDURE — 0502F SUBSEQUENT PRENATAL CARE: CPT | Performed by: ADVANCED PRACTICE MIDWIFE

## 2024-08-20 NOTE — PROGRESS NOTES
[facility] Prenatal Office Note  Subjective:  Kira Hawk is here for a return obstetrical visit. Today she is 36w6d weeks EGA.  Pt does feel fetal movement regularly. Denies headaches, RUQ pain, or visual changes. Denies vaginal bleeding or leaking of fluid.      Problems/Complaints today:  None  Objective:  Mother's Prenatal Vitals  BP: 138/86  Weight - Scale: 127.9 kg (282 lb)  Pulse: (!) 139  Patient Position: Sitting  Prenatal Fetal Information  Fundal Height (cm): 37 cm  Fetal HR: 145  Movement: Present  Cervical Exam  Dilation (cm): 2  Effacement: 50  Station: -2  Station (Labor Curve Graph): 7  Presentation: Cephalic  Dil/Eff/Sta  Dilation (cm): 2  Effacement: 50  Station: -2  Pt is A&Ox3, in no acute distress. Normocephalic, atraumatic. PERRL. Resp even and non-labored. Skin pink, warm & dry. Gravid abdomen. WOODWARD's well. Gait steady.   Assessment:    IUP at 36w6d wks      Diagnosis Orders   1. 37 weeks gestation of pregnancy        2. History of miscarriage, currently pregnant        3. Poor fetal growth affecting management of mother in third trimester, single or unspecified fetus        4. Encounter for supervision of other normal pregnancy, third trimester          Plan:  Problems/Complaints Management Plan:  None  Routine OB Management Plan:  Pt counseled on balanced nutrition, adequate fluid intake, taking PNV daily, and exercise along with labor precautions, GHTN precautions, Kick count, and  labor  Continue with routine prenatal care.   surveillance indicated for IUGR  RTC in 1 wks for prenatal visit    MEDICATIONS:  No orders of the defined types were placed in this encounter.    ORDERS:  No orders of the defined types were placed in this encounter.      More than 50% of this 20 min visit was education and counseling.        I Janice Allen LPN, am scribing for and in the presence of Rafia Huitron CNM  24  2:02 PM CDT   I have seen and examined the patient independently.  I

## 2024-08-21 DIAGNOSIS — Z3A.36 36 WEEKS GESTATION OF PREGNANCY: ICD-10-CM

## 2024-08-21 DIAGNOSIS — Z3A.36 36 WEEKS GESTATION OF PREGNANCY: Primary | ICD-10-CM

## 2024-08-24 SDOH — ECONOMIC STABILITY: INCOME INSECURITY: HOW HARD IS IT FOR YOU TO PAY FOR THE VERY BASICS LIKE FOOD, HOUSING, MEDICAL CARE, AND HEATING?: NOT VERY HARD

## 2024-08-24 SDOH — ECONOMIC STABILITY: FOOD INSECURITY: WITHIN THE PAST 12 MONTHS, YOU WORRIED THAT YOUR FOOD WOULD RUN OUT BEFORE YOU GOT MONEY TO BUY MORE.: NEVER TRUE

## 2024-08-24 SDOH — ECONOMIC STABILITY: FOOD INSECURITY: WITHIN THE PAST 12 MONTHS, THE FOOD YOU BOUGHT JUST DIDN'T LAST AND YOU DIDN'T HAVE MONEY TO GET MORE.: NEVER TRUE

## 2024-08-27 ENCOUNTER — ROUTINE PRENATAL (OUTPATIENT)
Dept: OBGYN CLINIC | Age: 29
End: 2024-08-27

## 2024-08-27 VITALS
WEIGHT: 288 LBS | HEART RATE: 105 BPM | BODY MASS INDEX: 45.2 KG/M2 | SYSTOLIC BLOOD PRESSURE: 120 MMHG | DIASTOLIC BLOOD PRESSURE: 82 MMHG

## 2024-08-27 DIAGNOSIS — O09.299 HISTORY OF MISCARRIAGE, CURRENTLY PREGNANT: ICD-10-CM

## 2024-08-27 DIAGNOSIS — Z3A.37 37 WEEKS GESTATION OF PREGNANCY: Primary | ICD-10-CM

## 2024-08-27 DIAGNOSIS — O09.93 HIGH-RISK PREGNANCY IN THIRD TRIMESTER: ICD-10-CM

## 2024-08-27 DIAGNOSIS — O36.5930 POOR FETAL GROWTH AFFECTING MANAGEMENT OF MOTHER IN THIRD TRIMESTER, SINGLE OR UNSPECIFIED FETUS: ICD-10-CM

## 2024-08-27 PROCEDURE — 0502F SUBSEQUENT PRENATAL CARE: CPT | Performed by: ADVANCED PRACTICE MIDWIFE

## 2024-08-27 NOTE — PROGRESS NOTES
[facility] Prenatal Office Note  Subjective:  Kira Hawk is here for a return obstetrical visit. Today she is 37w6d weeks EGA.  Pt does feel fetal movement regularly. Denies headaches, RUQ pain, or visual changes. Denies vaginal bleeding or leaking of fluid.      Problems/Complaints today:  None  Objective:  Mother's Prenatal Vitals  BP: 120/82  Weight - Scale: 130.6 kg (288 lb)  Pulse: (!) 105  Patient Position: Sitting  Prenatal Fetal Information  Fundal Height (cm): 38 cm  Fetal HR: 166-tpg  Movement: Present  Cervical Exam  Dilation (cm): 3  Effacement: 80  Station: -1  Station (Labor Curve Graph): 6  Dil/Eff/Sta  Dilation (cm): 3  Effacement: 80  Station: -1  Pt is A&Ox3, in no acute distress. Normocephalic, atraumatic. PERRL. Resp even and non-labored. Skin pink, warm & dry. Gravid abdomen. WOODWARD's well. Gait steady.   Assessment:    IUP at 37w6d wks      Diagnosis Orders   1. 37 weeks gestation of pregnancy        2. History of miscarriage, currently pregnant        3. Poor fetal growth affecting management of mother in third trimester, single or unspecified fetus        4. High-risk pregnancy in third trimester          Plan:  Problems/Complaints Management Plan:  None  Routine OB Management Plan:  Pt counseled on balanced nutrition, adequate fluid intake, taking PNV daily, and exercise along with labor precautions, GHTN precautions, and Kick count  Continue with routine prenatal care.   surveillance indicated for IUGR  RTC in 1 wks for prenatal visit    MEDICATIONS:  No orders of the defined types were placed in this encounter.    ORDERS:  No orders of the defined types were placed in this encounter.      More than 50% of this 20 min visit was education and counseling.      I Janice Allen LPN, am scribing for and in the presence of Rafia Huitron CNM  24  11:13 AM CDT     I have seen and examined the patient independently.  I reviewed all laboratory and imaging studies that are relevant.  I

## 2024-08-28 DIAGNOSIS — Z3A.37 37 WEEKS GESTATION OF PREGNANCY: ICD-10-CM

## 2024-08-28 DIAGNOSIS — Z3A.37 37 WEEKS GESTATION OF PREGNANCY: Primary | ICD-10-CM

## 2024-08-30 RX ORDER — ONDANSETRON 4 MG/1
4 TABLET, ORALLY DISINTEGRATING ORAL 3 TIMES DAILY PRN
Qty: 21 TABLET | Refills: 0 | Status: SHIPPED | OUTPATIENT
Start: 2024-08-30

## 2024-09-04 ENCOUNTER — ANESTHESIA EVENT (OUTPATIENT)
Dept: LABOR AND DELIVERY | Age: 29
End: 2024-09-04
Payer: COMMERCIAL

## 2024-09-04 ENCOUNTER — APPOINTMENT (OUTPATIENT)
Dept: LABOR AND DELIVERY | Age: 29
End: 2024-09-04
Payer: COMMERCIAL

## 2024-09-04 ENCOUNTER — ANESTHESIA (OUTPATIENT)
Dept: LABOR AND DELIVERY | Age: 29
End: 2024-09-04
Payer: COMMERCIAL

## 2024-09-04 ENCOUNTER — HOSPITAL ENCOUNTER (INPATIENT)
Age: 29
LOS: 1 days | Discharge: HOME | End: 2024-09-05
Attending: ADVANCED PRACTICE MIDWIFE | Admitting: ADVANCED PRACTICE MIDWIFE
Payer: COMMERCIAL

## 2024-09-04 PROBLEM — Z3A.39 39 WEEKS GESTATION OF PREGNANCY: Status: ACTIVE | Noted: 2024-09-04

## 2024-09-04 LAB
ABO/RH: NORMAL
AMPHET UR QL SCN: NEGATIVE
ANTIBODY SCREEN: NORMAL
BARBITURATES UR QL SCN: NEGATIVE
BENZODIAZ UR QL SCN: NEGATIVE
BUPRENORPHINE URINE: NEGATIVE
CANNABINOIDS UR QL SCN: NEGATIVE
COCAINE UR QL SCN: NEGATIVE
DRUG SCREEN COMMENT UR-IMP: NORMAL
ERYTHROCYTE [DISTWIDTH] IN BLOOD BY AUTOMATED COUNT: 12.9 % (ref 11.5–14.5)
FENTANYL SCREEN, URINE: NEGATIVE
HCT VFR BLD AUTO: 37.1 % (ref 37–47)
HGB BLD-MCNC: 12.1 G/DL (ref 12–16)
MCH RBC QN AUTO: 28.3 PG (ref 27–31)
MCHC RBC AUTO-ENTMCNC: 32.6 G/DL (ref 33–37)
MCV RBC AUTO: 86.9 FL (ref 81–99)
METHADONE UR QL SCN: NEGATIVE
METHAMPHETAMINE, URINE: NEGATIVE
OPIATES UR QL SCN: NEGATIVE
OXYCODONE UR QL SCN: NEGATIVE
PCP UR QL SCN: NEGATIVE
PLATELET # BLD AUTO: 279 K/UL (ref 130–400)
PMV BLD AUTO: 9.5 FL (ref 9.4–12.3)
RBC # BLD AUTO: 4.27 M/UL (ref 4.2–5.4)
TRICYCLIC ANTIDEPRESSANTS, UR: NEGATIVE
WBC # BLD AUTO: 13.3 K/UL (ref 4.8–10.8)

## 2024-09-04 PROCEDURE — 80307 DRUG TEST PRSMV CHEM ANLYZR: CPT

## 2024-09-04 PROCEDURE — 1220000000 HC SEMI PRIVATE OB R&B

## 2024-09-04 PROCEDURE — 2500000003 HC RX 250 WO HCPCS: Performed by: NURSE ANESTHETIST, CERTIFIED REGISTERED

## 2024-09-04 PROCEDURE — 59400 OBSTETRICAL CARE: CPT | Performed by: ADVANCED PRACTICE MIDWIFE

## 2024-09-04 PROCEDURE — 6360000002 HC RX W HCPCS: Performed by: NURSE ANESTHETIST, CERTIFIED REGISTERED

## 2024-09-04 PROCEDURE — 6360000002 HC RX W HCPCS: Performed by: ADVANCED PRACTICE MIDWIFE

## 2024-09-04 PROCEDURE — 86592 SYPHILIS TEST NON-TREP QUAL: CPT

## 2024-09-04 PROCEDURE — G0480 DRUG TEST DEF 1-7 CLASSES: HCPCS

## 2024-09-04 PROCEDURE — 3700000025 EPIDURAL BLOCK: Performed by: NURSE ANESTHETIST, CERTIFIED REGISTERED

## 2024-09-04 PROCEDURE — 2580000003 HC RX 258: Performed by: ADVANCED PRACTICE MIDWIFE

## 2024-09-04 PROCEDURE — 3E033VJ INTRODUCTION OF OTHER HORMONE INTO PERIPHERAL VEIN, PERCUTANEOUS APPROACH: ICD-10-PCS | Performed by: ADVANCED PRACTICE MIDWIFE

## 2024-09-04 PROCEDURE — 10907ZC DRAINAGE OF AMNIOTIC FLUID, THERAPEUTIC FROM PRODUCTS OF CONCEPTION, VIA NATURAL OR ARTIFICIAL OPENING: ICD-10-PCS | Performed by: ADVANCED PRACTICE MIDWIFE

## 2024-09-04 PROCEDURE — 86901 BLOOD TYPING SEROLOGIC RH(D): CPT

## 2024-09-04 PROCEDURE — 7200000001 HC VAGINAL DELIVERY

## 2024-09-04 PROCEDURE — 36415 COLL VENOUS BLD VENIPUNCTURE: CPT

## 2024-09-04 PROCEDURE — 85027 COMPLETE CBC AUTOMATED: CPT

## 2024-09-04 PROCEDURE — 86900 BLOOD TYPING SEROLOGIC ABO: CPT

## 2024-09-04 PROCEDURE — 86850 RBC ANTIBODY SCREEN: CPT

## 2024-09-04 PROCEDURE — 6370000000 HC RX 637 (ALT 250 FOR IP): Performed by: ADVANCED PRACTICE MIDWIFE

## 2024-09-04 RX ORDER — SODIUM CHLORIDE, SODIUM LACTATE, POTASSIUM CHLORIDE, AND CALCIUM CHLORIDE .6; .31; .03; .02 G/100ML; G/100ML; G/100ML; G/100ML
500 INJECTION, SOLUTION INTRAVENOUS PRN
Status: DISCONTINUED | OUTPATIENT
Start: 2024-09-04 | End: 2024-09-05 | Stop reason: HOSPADM

## 2024-09-04 RX ORDER — ONDANSETRON 2 MG/ML
4 INJECTION INTRAMUSCULAR; INTRAVENOUS EVERY 6 HOURS PRN
Status: DISCONTINUED | OUTPATIENT
Start: 2024-09-04 | End: 2024-09-04 | Stop reason: HOSPADM

## 2024-09-04 RX ORDER — ONDANSETRON 2 MG/ML
4 INJECTION INTRAMUSCULAR; INTRAVENOUS EVERY 6 HOURS PRN
Status: DISCONTINUED | OUTPATIENT
Start: 2024-09-04 | End: 2024-09-05 | Stop reason: HOSPADM

## 2024-09-04 RX ORDER — EPHEDRINE SULFATE/0.9% NACL/PF 25 MG/5 ML
10 SYRINGE (ML) INTRAVENOUS
Status: DISCONTINUED | OUTPATIENT
Start: 2024-09-04 | End: 2024-09-04 | Stop reason: HOSPADM

## 2024-09-04 RX ORDER — LIDOCAINE HYDROCHLORIDE AND EPINEPHRINE 15; 5 MG/ML; UG/ML
INJECTION, SOLUTION EPIDURAL PRN
Status: DISCONTINUED | OUTPATIENT
Start: 2024-09-04 | End: 2024-09-04 | Stop reason: SDUPTHER

## 2024-09-04 RX ORDER — SODIUM CHLORIDE 0.9 % (FLUSH) 0.9 %
5-40 SYRINGE (ML) INJECTION EVERY 12 HOURS SCHEDULED
Status: DISCONTINUED | OUTPATIENT
Start: 2024-09-04 | End: 2024-09-05 | Stop reason: HOSPADM

## 2024-09-04 RX ORDER — CARBOPROST TROMETHAMINE 250 UG/ML
250 INJECTION, SOLUTION INTRAMUSCULAR PRN
Status: DISCONTINUED | OUTPATIENT
Start: 2024-09-04 | End: 2024-09-05 | Stop reason: HOSPADM

## 2024-09-04 RX ORDER — EPHEDRINE SULFATE/0.9% NACL/PF 25 MG/5 ML
5 SYRINGE (ML) INTRAVENOUS PRN
Status: DISCONTINUED | OUTPATIENT
Start: 2024-09-05 | End: 2024-09-04 | Stop reason: HOSPADM

## 2024-09-04 RX ORDER — ONDANSETRON 4 MG/1
4 TABLET, ORALLY DISINTEGRATING ORAL EVERY 6 HOURS PRN
Status: DISCONTINUED | OUTPATIENT
Start: 2024-09-04 | End: 2024-09-05 | Stop reason: HOSPADM

## 2024-09-04 RX ORDER — TRANEXAMIC ACID 10 MG/ML
1000 INJECTION, SOLUTION INTRAVENOUS
Status: ACTIVE | OUTPATIENT
Start: 2024-09-04 | End: 2024-09-05

## 2024-09-04 RX ORDER — SODIUM CHLORIDE 9 MG/ML
25 INJECTION, SOLUTION INTRAVENOUS PRN
Status: DISCONTINUED | OUTPATIENT
Start: 2024-09-04 | End: 2024-09-05 | Stop reason: HOSPADM

## 2024-09-04 RX ORDER — ACETAMINOPHEN 500 MG
1000 TABLET ORAL EVERY 8 HOURS
Status: DISCONTINUED | OUTPATIENT
Start: 2024-09-04 | End: 2024-09-05 | Stop reason: HOSPADM

## 2024-09-04 RX ORDER — SODIUM CHLORIDE 9 MG/ML
INJECTION, SOLUTION INTRAVENOUS PRN
Status: DISCONTINUED | OUTPATIENT
Start: 2024-09-04 | End: 2024-09-05 | Stop reason: HOSPADM

## 2024-09-04 RX ORDER — NALOXONE HYDROCHLORIDE 0.4 MG/ML
INJECTION, SOLUTION INTRAMUSCULAR; INTRAVENOUS; SUBCUTANEOUS PRN
Status: DISCONTINUED | OUTPATIENT
Start: 2024-09-04 | End: 2024-09-04 | Stop reason: HOSPADM

## 2024-09-04 RX ORDER — SODIUM CHLORIDE 0.9 % (FLUSH) 0.9 %
5-40 SYRINGE (ML) INJECTION PRN
Status: DISCONTINUED | OUTPATIENT
Start: 2024-09-04 | End: 2024-09-05 | Stop reason: HOSPADM

## 2024-09-04 RX ORDER — DOCUSATE SODIUM 100 MG/1
100 CAPSULE, LIQUID FILLED ORAL 2 TIMES DAILY
Status: DISCONTINUED | OUTPATIENT
Start: 2024-09-04 | End: 2024-09-05 | Stop reason: HOSPADM

## 2024-09-04 RX ORDER — ACETAMINOPHEN 325 MG/1
650 TABLET ORAL EVERY 4 HOURS PRN
Status: DISCONTINUED | OUTPATIENT
Start: 2024-09-04 | End: 2024-09-04 | Stop reason: HOSPADM

## 2024-09-04 RX ORDER — MISOPROSTOL 200 UG/1
400 TABLET ORAL PRN
Status: DISCONTINUED | OUTPATIENT
Start: 2024-09-04 | End: 2024-09-05 | Stop reason: HOSPADM

## 2024-09-04 RX ORDER — METHYLERGONOVINE MALEATE 0.2 MG/ML
200 INJECTION INTRAVENOUS PRN
Status: DISCONTINUED | OUTPATIENT
Start: 2024-09-04 | End: 2024-09-05 | Stop reason: HOSPADM

## 2024-09-04 RX ORDER — ROPIVACAINE HYDROCHLORIDE 2 MG/ML
8 INJECTION, SOLUTION EPIDURAL; INFILTRATION; PERINEURAL CONTINUOUS
Status: DISCONTINUED | OUTPATIENT
Start: 2024-09-04 | End: 2024-09-04 | Stop reason: HOSPADM

## 2024-09-04 RX ORDER — IBUPROFEN 400 MG/1
800 TABLET, FILM COATED ORAL EVERY 8 HOURS
Status: DISCONTINUED | OUTPATIENT
Start: 2024-09-04 | End: 2024-09-05 | Stop reason: HOSPADM

## 2024-09-04 RX ORDER — ROPIVACAINE HYDROCHLORIDE 2 MG/ML
INJECTION, SOLUTION EPIDURAL; INFILTRATION; PERINEURAL PRN
Status: DISCONTINUED | OUTPATIENT
Start: 2024-09-04 | End: 2024-09-04 | Stop reason: SDUPTHER

## 2024-09-04 RX ORDER — DEXMEDETOMIDINE HYDROCHLORIDE 100 UG/ML
INJECTION, SOLUTION INTRAVENOUS PRN
Status: DISCONTINUED | OUTPATIENT
Start: 2024-09-04 | End: 2024-09-04 | Stop reason: SDUPTHER

## 2024-09-04 RX ORDER — TERBUTALINE SULFATE 1 MG/ML
0.25 INJECTION, SOLUTION SUBCUTANEOUS
Status: ACTIVE | OUTPATIENT
Start: 2024-09-04 | End: 2024-09-05

## 2024-09-04 RX ORDER — SODIUM CHLORIDE, SODIUM LACTATE, POTASSIUM CHLORIDE, CALCIUM CHLORIDE 600; 310; 30; 20 MG/100ML; MG/100ML; MG/100ML; MG/100ML
INJECTION, SOLUTION INTRAVENOUS CONTINUOUS
Status: DISCONTINUED | OUTPATIENT
Start: 2024-09-04 | End: 2024-09-05 | Stop reason: HOSPADM

## 2024-09-04 RX ADMIN — ONDANSETRON 4 MG: 2 INJECTION, SOLUTION INTRAMUSCULAR; INTRAVENOUS at 12:43

## 2024-09-04 RX ADMIN — DEXMEDETOMIDINE HYDROCHLORIDE 20 MCG: 100 INJECTION, SOLUTION, CONCENTRATE INTRAVENOUS at 13:10

## 2024-09-04 RX ADMIN — DEXMEDETOMIDINE HYDROCHLORIDE 20 MCG: 100 INJECTION, SOLUTION, CONCENTRATE INTRAVENOUS at 13:07

## 2024-09-04 RX ADMIN — ACETAMINOPHEN 1000 MG: 500 TABLET ORAL at 21:03

## 2024-09-04 RX ADMIN — ROPIVACAINE HYDROCHLORIDE 8 ML: 2 INJECTION, SOLUTION EPIDURAL; INFILTRATION at 13:07

## 2024-09-04 RX ADMIN — Medication 166.7 ML: at 14:29

## 2024-09-04 RX ADMIN — SODIUM CHLORIDE, POTASSIUM CHLORIDE, SODIUM LACTATE AND CALCIUM CHLORIDE: 600; 310; 30; 20 INJECTION, SOLUTION INTRAVENOUS at 06:09

## 2024-09-04 RX ADMIN — Medication 1 MILLI-UNITS/MIN: at 06:16

## 2024-09-04 RX ADMIN — IBUPROFEN 800 MG: 400 TABLET, FILM COATED ORAL at 16:58

## 2024-09-04 RX ADMIN — LIDOCAINE HYDROCHLORIDE AND EPINEPHRINE 3 ML: 15; 5 INJECTION, SOLUTION EPIDURAL at 13:01

## 2024-09-04 RX ADMIN — DOCUSATE SODIUM 100 MG: 100 CAPSULE, LIQUID FILLED ORAL at 21:03

## 2024-09-04 SDOH — ECONOMIC STABILITY: INCOME INSECURITY: HOW HARD IS IT FOR YOU TO PAY FOR THE VERY BASICS LIKE FOOD, HOUSING, MEDICAL CARE, AND HEATING?: NOT HARD AT ALL

## 2024-09-04 SDOH — ECONOMIC STABILITY: INCOME INSECURITY: IN THE PAST 12 MONTHS, HAS THE ELECTRIC, GAS, OIL, OR WATER COMPANY THREATENED TO SHUT OFF SERVICE IN YOUR HOME?: NO

## 2024-09-04 ASSESSMENT — PATIENT HEALTH QUESTIONNAIRE - PHQ9
1. LITTLE INTEREST OR PLEASURE IN DOING THINGS: NOT AT ALL
SUM OF ALL RESPONSES TO PHQ9 QUESTIONS 1 & 2: 0
SUM OF ALL RESPONSES TO PHQ QUESTIONS 1-9: 0
2. FEELING DOWN, DEPRESSED OR HOPELESS: NOT AT ALL
SUM OF ALL RESPONSES TO PHQ QUESTIONS 1-9: 0

## 2024-09-04 ASSESSMENT — PAIN SCALES - GENERAL
PAINLEVEL_OUTOF10: 1
PAINLEVEL_OUTOF10: 2

## 2024-09-04 ASSESSMENT — PAIN DESCRIPTION - LOCATION: LOCATION: PERINEUM

## 2024-09-04 NOTE — ANESTHESIA PRE PROCEDURE
Department of Anesthesiology  Preprocedure Note       Name:  Kira Hawk   Age:  29 y.o.  :  1995                                          MRN:  [id number]         Date:  2024      Surgeon: * No surgeons listed *    Procedure: * No procedures listed *    Medications prior to admission:   Prior to Admission medications    Medication Sig Start Date End Date Taking? Authorizing Provider   ondansetron (ZOFRAN-ODT) 4 MG disintegrating tablet Take 1 tablet by mouth 3 times daily as needed for Nausea or Vomiting 24  Yes Dominga Elise APRN   Prenatal Vit-Fe Fumarate-FA (PRENATAL 1 PLUS 1 PO)  23  Yes Provider, MD Charlotte       Current medications:    Current Facility-Administered Medications   Medication Dose Route Frequency Provider Last Rate Last Admin   • terbutaline (BRETHINE) injection 0.25 mg  0.25 mg SubCUTAneous Once PRN Rafia Huitron, APRN - CNM       • lactated ringers IV soln infusion   IntraVENous Continuous Sabiha Huitronca, APRN -  mL/hr at 24 1218 Rate Verify at 24 1218   • lactated ringers bolus 500 mL  500 mL IntraVENous PRN Tomy Rafia, APRN - CNM        Or   • lactated ringers bolus 500 mL  500 mL IntraVENous PRN Tomy Rafia, APRN - CNM       • sodium chloride flush 0.9 % injection 5-40 mL  5-40 mL IntraVENous 2 times per day Tomy Rafia, APRN - CNM       • sodium chloride flush 0.9 % injection 5-40 mL  5-40 mL IntraVENous PRN Tomy Rafia, APRN - CNM       • 0.9 % sodium chloride infusion  25 mL IntraVENous PRN Tomy Rafia, APRN - CNM       • ondansetron (ZOFRAN) injection 4 mg  4 mg IntraVENous Q6H PRN Tomy Rafia, APRN - CNM   4 mg at 24 1243    Or   • ondansetron (ZOFRAN-ODT) disintegrating tablet 4 mg  4 mg Oral Q6H PRN Tomy Rafia, APRN - CNM       • oxytocin (PITOCIN) 30 units in 500 mL infusion  87.3 josué-units/min IntraVENous Continuous PRN Tomy Rafia, APRN - CNM        And   • oxytocin (PITOCIN) 10

## 2024-09-04 NOTE — LACTATION NOTE
Infant Name: Jose  Gestation: 39.0  Day of Life: NB  Birth weight: (3200g)  Today's weight:  Weight loss:  24 hour summary of feeds: breastfeeding x 1  Voids:  Stools:  Assistive device: none  Maternal History: , eczema, GERD< Cholecystectomy laparoscopic, wisdom tooth extraction  Maternal Medications: zofran, PNV  Maternal Goal: one day at a time  Breast pump for home:  yes    Mother independently positioned and latching baby to left breast, some jaw dropping sucks noted. Chin and cheeks touching breast, nose free to breathe.    Instructed mother to continue breastfeed every 2- 3 hours for 15-20 mins each side or on demand watching for hunger cues and using waking techniques when needed. 8-12 feedings in 24 hours being the goal. Hand expression and breast compressions encouraged to increase milk supply and transfer. Discussed the benefits of colostrum, skin to skin and the importance of good positioning and latch. Informed mother that baby can be very sleepy the first 24 hours and typically the 2nd night babies will be more awake and want to feed a lot and that this is normal and important in establishing milk supply. Discussed supply and demand. All questions and concerns answered at this time. Encouraged to call out for help with feedings when needed.

## 2024-09-04 NOTE — CARE COORDINATION
Eryn provided written documentation for community resources based on Pt SDOH scores and any additional community resources as needed. These resources will be provided to the Pt at D/C. Electronically signed by Eryn Cabral on 9/4/2024 at 6:11 AM

## 2024-09-04 NOTE — H&P
Things Outside the Home?: Not on file     Physical Sign of Abuse Present: Not on file   Housing Stability: Not on file (9/4/2024)       Family History:       Problem Relation Age of Onset    Diabetes Mother     Cancer Paternal Grandmother         breast    Cancer Other         mat great grandma-colon    Cancer Other         mat aunt-uterine       Medications Prior to Admission:  Medications Prior to Admission: ondansetron (ZOFRAN-ODT) 4 MG disintegrating tablet, Take 1 tablet by mouth 3 times daily as needed for Nausea or Vomiting  Prenatal Vit-Fe Fumarate-FA (PRENATAL 1 PLUS 1 PO),     PHYSICAL EXAM:     Vitals:    09/04/24 0818   BP:    Pulse:    Resp:    Temp: 97 °F (36.1 °C)       General appearance:  awake, alert, cooperative, no apparent distress, and appears stated age  Skin:  Warm, dry, no rashes or erythema  Neurologic:  Awake, alert, oriented to name, place and time.  Cranial nerves II-XII are grossly intact.  Motor is 5 out of 5 bilaterally.  Lungs:  No increased work of breathing, good air exchange, clear to auscultation bilaterally, no crackles or wheezing  Heart:  Normal apical impulse, regular rate and rhythm, normal S1 and S2, no S3 or S4, and no murmur noted  Breast:  Deferred   Abdomen: Gravid, Non-Tender  Extremities:  no calf tenderness, non edematous, DTRs: normal    Pelvic Exam:  FETALPRESENTATION:Cephalic  DILATION:  3 cm  EFFACEMENT:   80%  STATION:  -2 cm  CONSISTENCY:  soft  POSITION:  mid    MEMBRANES:  Intact                                FETAL HEART RATE:    Baseline: 150      Variability: moderate      Accelerations: present      Decelerations: absent      FHR: Category: 1          CONTRACTIONS:   Frequency: irregular  Duration: 60 seconds  Intensity: Mild by palpation  Resting tone: palpates soft between contractions, adequate resting tone     Lab Results:   Blood Type/Rh:    ABO/Rh   Date Value Ref Range Status   09/04/2024 A POS  Final     Antibody Screen:    Antibody Screen   Date

## 2024-09-04 NOTE — ANESTHESIA POSTPROCEDURE EVALUATION
Department of Anesthesiology  Postprocedure Note    Patient: Kira Hawk  MRN: [id number]  YOB: 1995  Date of evaluation: 9/4/2024    Procedure Summary       Date: 09/04/24 Room / Location:     Anesthesia Start: 1236 Anesthesia Stop: 1427    Procedure: Labor Analgesia Diagnosis:     Scheduled Providers:  Responsible Provider: Tim Tomas APRN - CRNA    Anesthesia Type: CSE, epidural ASA Status: 2            Anesthesia Type: No value filed.    Pavan Phase I:      Pavan Phase II:      Anesthesia Post Evaluation    Patient location during evaluation: floor  Patient participation: complete - patient participated  Level of consciousness: awake and alert  Pain score: 0  Airway patency: patent  Nausea & Vomiting: no nausea and no vomiting  Cardiovascular status: blood pressure returned to baseline and hemodynamically stable  Respiratory status: room air and acceptable  Hydration status: euvolemic  Multimodal analgesia pain management approach  Pain management: adequate    No notable events documented.

## 2024-09-04 NOTE — DISCHARGE INSTRUCTIONS
Eryn provided written documentation for community resources based on Pt SDOH scores and any additional community resources as needed. These resources will be provided to the Pt at D/C. Electronically signed by Eryn Cabral on 2024 at 6:11 AM     POSTPARTUM EDUCATION / DISCHARGE PLANNING    Call Doctor:  1. If temp 100.4 or greater.  2. If foul smelling discharge.  3. If discharge changes from pink or yellow, back to red, and bleeding is heavier than a normal period.  4. If you pass large clots.   5. If abdominal incision starts to separate and/or starts to bleeding, is red and warm to touch or has drainage with a foul odor.  6. Report any pain, redness, or warmth of skin in calf of leg which could indicate a blood clot.    Crampin. Cramping is normal; uterus is returning to pre-pregnant state.  2. Moms of twins and mothers of more than one child and breast-feeding mothers will cramp more than first time moms.  3. For relief, place pillow under abdomen and lie on it to apply pressure. Walking may help.    Discharge:   1. Discharge will be dark for first few days (similar to menstrual flow). It will turn to pinkish brown after 2-3 days and creamy yellow for an additional week or two. Moderate flow-use of 4-8 pads daily.  2. Small clots are normal.    Episiotomy Care:  1. May use sitz bath 3-4 times daily.  2. Use analgesic foams or sprays or medicine as ordered.   3. Keep perineal area clean.  4. Continue to use lore bottle after urinating and gently pat from front to back.  5. Stitches will dissolve on their own. If you have stitches, shower only for 2-4 weeks or as directed by your doctor to allow suture line to heal properly. No baths, hot tubs or swimming pools until told it is alright to do so by your doctor.    Abdominal Incision Care:  1. Leave open to air after original dressing is removed.   2. Clean incision with soapy water unless otherwise directed.    Return of Periods:  1.

## 2024-09-04 NOTE — FLOWSHEET NOTE
Pt sitting up for epidural. RN x2 at bedside, attempting to obtain FHT but difficult during procedure due to positioning. FHT periodically audible through EFM by RN x2. [phone]

## 2024-09-04 NOTE — FLOWSHEET NOTE
This nurse to room to adjust EFM. Pt sitting on toilet. FHT audible in the 120s but unable to maintain tracing due to maternal position.

## 2024-09-04 NOTE — ANESTHESIA PROCEDURE NOTES
Epidural Block    Patient location during procedure: OB  Reason for block: labor epidural  Staffing  Performed: resident/CRNA   Resident/CRNA: Tim Tomas APRN - CRNA  Performed by: Tim Tomas APRN - CRNA  Authorized by: Tim Tomas APRN - CRNA    Epidural  Patient position: sitting  Prep: Betadine  Patient monitoring: cardiac monitor, continuous pulse ox and frequent blood pressure checks  Approach: midline  Location: L3-4  Injection technique: FABRICIO saline  Provider prep: mask and sterile gloves  Needle  Needle type: Tuohy   Needle gauge: 17 G  Needle length: 3.5 in  Needle insertion depth: 10 cm  Catheter type: multi-orifice  Catheter size: 19 G  Catheter at skin depth: 15 cm  Test dose: negativeCatheter Secured: tegaderm and tape  Assessment  Sensory level: T8  Events: cerebrospinal fluid  Hemodynamics: stable  Attempts: 1  Outcomes: uncomplicated and patient tolerated procedure well  Additional Notes  CSE 25g 5inch Pencan spinal needle     Preanesthetic Checklist  Completed: patient identified, IV checked, site marked, risks and benefits discussed, surgical/procedural consents, equipment checked, pre-op evaluation, timeout performed, anesthesia consent given, oxygen available, monitors applied/VS acknowledged, fire risk safety assessment completed and verbalized and blood product R/B/A discussed and consented

## 2024-09-05 VITALS
SYSTOLIC BLOOD PRESSURE: 118 MMHG | RESPIRATION RATE: 18 BRPM | OXYGEN SATURATION: 99 % | TEMPERATURE: 97.4 F | HEART RATE: 78 BPM | DIASTOLIC BLOOD PRESSURE: 68 MMHG

## 2024-09-05 LAB
BASOPHILS # BLD: 0 K/UL (ref 0–0.2)
BASOPHILS NFR BLD: 0.3 % (ref 0–1)
EOSINOPHIL # BLD: 0.1 K/UL (ref 0–0.6)
EOSINOPHIL NFR BLD: 0.9 % (ref 0–5)
ERYTHROCYTE [DISTWIDTH] IN BLOOD BY AUTOMATED COUNT: 13 % (ref 11.5–14.5)
HCT VFR BLD AUTO: 32.9 % (ref 37–47)
HGB BLD-MCNC: 10.5 G/DL (ref 12–16)
IMM GRANULOCYTES # BLD: 0.1 K/UL
LYMPHOCYTES # BLD: 2.3 K/UL (ref 1.1–4.5)
LYMPHOCYTES NFR BLD: 16.3 % (ref 20–40)
MCH RBC QN AUTO: 27.7 PG (ref 27–31)
MCHC RBC AUTO-ENTMCNC: 31.9 G/DL (ref 33–37)
MCV RBC AUTO: 86.8 FL (ref 81–99)
MONOCYTES # BLD: 0.8 K/UL (ref 0–0.9)
MONOCYTES NFR BLD: 5.8 % (ref 0–10)
NEUTROPHILS # BLD: 10.7 K/UL (ref 1.5–7.5)
NEUTS SEG NFR BLD: 76.3 % (ref 50–65)
PLATELET # BLD AUTO: 252 K/UL (ref 130–400)
PMV BLD AUTO: 9.3 FL (ref 9.4–12.3)
RBC # BLD AUTO: 3.79 M/UL (ref 4.2–5.4)
RPR SER QL: NORMAL
WBC # BLD AUTO: 14 K/UL (ref 4.8–10.8)

## 2024-09-05 PROCEDURE — 36415 COLL VENOUS BLD VENIPUNCTURE: CPT

## 2024-09-05 PROCEDURE — 6370000000 HC RX 637 (ALT 250 FOR IP): Performed by: ADVANCED PRACTICE MIDWIFE

## 2024-09-05 PROCEDURE — 85025 COMPLETE CBC W/AUTO DIFF WBC: CPT

## 2024-09-05 RX ORDER — IBUPROFEN 800 MG/1
800 TABLET, FILM COATED ORAL EVERY 8 HOURS
Qty: 30 TABLET | Refills: 0 | Status: SHIPPED | OUTPATIENT
Start: 2024-09-05

## 2024-09-05 RX ADMIN — DOCUSATE SODIUM 100 MG: 100 CAPSULE, LIQUID FILLED ORAL at 08:00

## 2024-09-05 RX ADMIN — ACETAMINOPHEN 1000 MG: 500 TABLET ORAL at 04:41

## 2024-09-05 RX ADMIN — IBUPROFEN 800 MG: 400 TABLET, FILM COATED ORAL at 01:06

## 2024-09-05 RX ADMIN — ACETAMINOPHEN 1000 MG: 500 TABLET ORAL at 12:41

## 2024-09-05 RX ADMIN — IBUPROFEN 800 MG: 400 TABLET, FILM COATED ORAL at 08:00

## 2024-09-05 ASSESSMENT — PAIN SCALES - GENERAL
PAINLEVEL_OUTOF10: 1
PAINLEVEL_OUTOF10: 1
PAINLEVEL_OUTOF10: 2
PAINLEVEL_OUTOF10: 0

## 2024-09-05 ASSESSMENT — PAIN DESCRIPTION - DESCRIPTORS: DESCRIPTORS: CRAMPING

## 2024-09-05 ASSESSMENT — PAIN DESCRIPTION - LOCATION: LOCATION: ABDOMEN

## 2024-09-05 NOTE — FLOWSHEET NOTE
Discharge teaching completed. All questions answered. Follow up appointments reviewed. Bands verified, security tag d/c'd.  Patient refused wheelchair transport.

## 2024-09-05 NOTE — CARE COORDINATION
Consult/Update: TASHA Sheldon met with Pt at bedside to discuss needs/concerns. Pt reported her Pediatrician she  has chosen  Dr. Ambrocio ([phone] appointment scheduled for 9/06/24 at 11:00 AM.  This writer also received permission to complete application for the [employer] (free books). This writer completed application for Pt.

## 2024-09-05 NOTE — L&D DELIVERY NOTE
Du Hawk [[id number]]      Labor Events     Labor: No   Steroids: None  Cervical Ripening Date/Time:      Antibiotics Received during Labor: No  Rupture Date/Time:  24 08:15:00   Rupture Type: AROM  Fluid Color: Clear  Fluid Odor: None  Fluid Volume: Scant  Induction: Oxytocin, AROM  Augmentation: None  Labor Complications: None       Anesthesia    Method: Epidural       Labor Event Times      Labor onset date/time:        Dilation complete date/time:  24 14:12:00 CDT     Start pushing date/time:  2024 14:16:00   Decision date/time (emergent ):            Labor Length    2nd stage: 0h 15m  3rd stage: 0h 05m       Delivery Details      Delivery Date: 24 Delivery Time: 14:27:00   Delivery Type: Vaginal, Spontaneous              [place] Presentation    Presentation: Vertex  _: Occiput       Shoulder Dystocia    Shoulder Dystocia Present?: No       Assisted Delivery Details    Forceps Attempted?: No  Vacuum Extractor Attempted?: No                           Cord    Complications: Nuchal Loose  Cord Around: Head  Delayed Cord Clamping?: Yes  Cord Clamped Date/Time: 2024 14:29:00  Cord Blood Disposition: Discard  Gases Sent?: No              Placenta    Date/Time: 2024 14:32:00  Removal: Spontaneous  Appearance: Intact  Disposition: Discarded       Lacerations    Episiotomy: None  Perineal Lacerations: None  Other Lacerations: no non-perineal laceration       Vaginal Counts    Initial Count Personnel: LEORA LEAL  Initial Count Verified By: CHRYSTAL HOBBS  Intial Sponge Count: Correct Intial Needles Count: Correct Intial Instruments Count: Correct   Final Sponges Count: Correct Final Needles  Count: Correct Final Instruments Count: Correct   Final Count Personnel: CHRYSTAL HOBBS  Final Count Verified By: LEORA LEAL  Accurate Final Count?: Yes       Blood Loss  Mother: Kira Hawk #[id number]     Start of Mother's Information      Delivery Blood Loss  24 0227 - 24

## 2024-09-05 NOTE — LACTATION NOTE
Infant Name: Jose  Gestation: 39.0  Day of Life: 1  Birth weight: (3200g)  Today's weight: 6-15.8 lb (3170g)  Weight loss: -1%  24 hour summary of feeds: breastfeeding x 7  Voids: 1  Stools: 4  Assistive device: none  Maternal History: , eczema, GERD< Cholecystectomy laparoscopic, wisdom tooth extraction  Maternal Medications: zofran, PNV  Maternal Goal: one day at a time  Breast pump for home:  yes      Instructed mother to continue breastfeed every 2- 3 hours for 15-20 mins each side or on demand watching for hunger cues and using waking techniques when needed. 8-12 feedings in 24 hours being the goal. Hand expression and breast compressions encouraged to increase milk supply and transfer. Reminded mother about supply and demand. Breastfeeding book given. Mother and baby will be discharged today, weight check to follow. Instructions and handouts given over management of sore nipples, engorgement, plugged ducts, mastitis, hydration, nutrition, and medications that could effect milk supply. Mother knows when to call MD if needed. Lactation number and hours provided. Mother knows she can call and make appointment for pre and post feeding weights whenever needed or can call with questions or concerns her entire breastfeeding journey. All questions at this time answered. Support and Encouragement given.

## 2024-09-05 NOTE — DISCHARGE SUMMARY
in stable condition  She was asked to follow up in the office in 2 weeks.      Discharge Date: 9/5/2024      CASEY Angeles CNM      Comments:  Follow-up care and birth control were reviewed. Signs and symptoms of mastitis and Post Partum Depression were reviewed.The patient is to notify her physician if any of these occur. The patient was counseled on secondary smoke risks and the increased risk of sudden infant death syndrome and respiratory problems to her baby with exposure. She was counseled on various alternate recommendations to decrease the exposure to secondary smoke to her children.     Total time spent on discharge and coordination of care is >30 minutes. All questions were answered and the patient voiced understanding.

## 2024-09-05 NOTE — PLAN OF CARE
Problem: Infection - Adult  Goal: Absence of infection at discharge  Outcome: Progressing  Goal: Absence of infection during hospitalization  Outcome: Progressing  Goal: Absence of fever/infection during anticipated neutropenic period  Outcome: Progressing     Problem: Safety - Adult  Goal: Free from fall injury  Outcome: Progressing     Problem: Postpartum  Goal: Experiences normal postpartum course  Description:  Postpartum OB-Pregnancy care plan goal which identifies if the mother is experiencing a normal postpartum course  Outcome: Progressing  Goal: Appropriate maternal -  bonding  Description:  Postpartum OB-Pregnancy care plan goal which identifies if the mother and  are bonding appropriately  Outcome: Progressing  Goal: Establishment of infant feeding pattern  Description:  Postpartum OB-Pregnancy care plan goal which identifies if the mother is establishing a feeding pattern with their   Outcome: Progressing  Goal: Incisions, wounds, or drain sites healing without S/S of infection  Outcome: Progressing     Problem: Pain  Goal: Verbalizes/displays adequate comfort level or baseline comfort level  Outcome: Progressing     Problem: Discharge Planning  Goal: Discharge to home or other facility with appropriate resources  Outcome: Progressing     Problem: Chronic Conditions and Co-morbidities  Goal: Patient's chronic conditions and co-morbidity symptoms are monitored and maintained or improved  Outcome: Progressing

## 2024-09-05 NOTE — FLOWSHEET NOTE
Discharge teaching completed. All questions answered. Follow up appointments reviewed.     Carmen Morin RN

## 2024-09-09 ENCOUNTER — TELEPHONE (OUTPATIENT)
Dept: OBGYN CLINIC | Age: 29
End: 2024-09-09

## 2024-09-09 ENCOUNTER — CARE COORDINATION (OUTPATIENT)
Dept: OTHER | Facility: CLINIC | Age: 29
End: 2024-09-09

## 2024-09-15 SDOH — ECONOMIC STABILITY: FOOD INSECURITY: WITHIN THE PAST 12 MONTHS, YOU WORRIED THAT YOUR FOOD WOULD RUN OUT BEFORE YOU GOT MONEY TO BUY MORE.: NEVER TRUE

## 2024-09-15 SDOH — ECONOMIC STABILITY: FOOD INSECURITY: WITHIN THE PAST 12 MONTHS, THE FOOD YOU BOUGHT JUST DIDN'T LAST AND YOU DIDN'T HAVE MONEY TO GET MORE.: NEVER TRUE

## 2024-09-15 SDOH — ECONOMIC STABILITY: INCOME INSECURITY: HOW HARD IS IT FOR YOU TO PAY FOR THE VERY BASICS LIKE FOOD, HOUSING, MEDICAL CARE, AND HEATING?: NOT HARD AT ALL

## 2024-09-16 ENCOUNTER — PATIENT MESSAGE (OUTPATIENT)
Dept: OBGYN CLINIC | Age: 29
End: 2024-09-16

## 2024-09-16 RX ORDER — CEPHALEXIN 500 MG/1
500 CAPSULE ORAL 2 TIMES DAILY
Qty: 14 CAPSULE | Refills: 0 | Status: SHIPPED | OUTPATIENT
Start: 2024-09-16 | End: 2024-09-23

## 2024-09-17 PROBLEM — Z3A.39 39 WEEKS GESTATION OF PREGNANCY: Status: RESOLVED | Noted: 2024-09-04 | Resolved: 2024-09-17

## 2024-09-18 ENCOUNTER — POSTPARTUM VISIT (OUTPATIENT)
Dept: OBGYN CLINIC | Age: 29
End: 2024-09-18

## 2024-09-18 VITALS
HEART RATE: 116 BPM | WEIGHT: 280 LBS | SYSTOLIC BLOOD PRESSURE: 133 MMHG | DIASTOLIC BLOOD PRESSURE: 82 MMHG | BODY MASS INDEX: 43.95 KG/M2 | HEIGHT: 67 IN

## 2024-09-18 DIAGNOSIS — Z13.32 ENCOUNTER FOR SCREENING FOR MATERNAL DEPRESSION: ICD-10-CM

## 2024-09-18 PROCEDURE — 0503F POSTPARTUM CARE VISIT: CPT | Performed by: ADVANCED PRACTICE MIDWIFE

## 2024-10-07 ENCOUNTER — CARE COORDINATION (OUTPATIENT)
Dept: OTHER | Facility: CLINIC | Age: 29
End: 2024-10-07

## 2024-10-07 NOTE — CARE COORDINATION
Call within 2 business days of discharge: Yes     Patient Current Location: Home: [facility] [place] [address]    Last Discharge Facility       Date Complaint Diagnosis Description Type Department Provider    24 Scheduled Induction  Admission (Discharged) [facility] Rafia Huitron APRN - CNM            Maternity Care Manager contacted the patient by telephone to discuss the maternity management program.  Patient agrees to care management services at this time. Verified name and  with patient as identifiers.     Risk Factors Identified:  None      Needs to be reviewed by the provider   none         Method of communication with provider : none    Advance Care Planning:   Does patient have an Advance Directive:  reviewed and current.     Does patient have OB/Gyn Selected? Yes    Discussed follow up appointments. If no appointment was previously scheduled, appointment scheduling offered: Yes  [facility] follow up appointment(s):   Future Appointments   Date Time Provider Department Center   2/10/2025  3:00 PM Rafia Huitron APRN - CNM OB[facility]GYN [facility]     Non-BS follow up appointment(s): n/a    OB History:   OB History    Para Term  AB Living   3 2 2   1 2   SAB IAB Ectopic Molar Multiple Live Births   1       0 2      # Outcome Date GA Lbr Vik/2nd Weight Sex Type Anes PTL Lv   3 Term 24 39w0d / 00:15 3.2 kg (7 lb 0.9 oz) M Vag-Spont EPI N JAMEY   2 Term 21 40w0d  3.38 kg (7 lb 7.2 oz) F Vag-Spont EPI N JAMEY      Birth Comments: HC 34cm AGA   1 SAB                Unknown    Medication reconciliation was performed with patient, who verbalizes understanding of administration of home medications. Advised obtaining a 90-day supply of all daily and as-needed medications.     Barriers/Support system:  patient and spouse  Return to work planning? At 14 weeks PP      Postpartum Assessment:  Vaginal  Lochia-minimal  Fever No  BM? Yes   Decreased urinary output No  Perineal

## 2025-01-07 ENCOUNTER — CARE COORDINATION (OUTPATIENT)
Dept: OTHER | Facility: CLINIC | Age: 30
End: 2025-01-07